# Patient Record
Sex: MALE | Race: BLACK OR AFRICAN AMERICAN | Employment: UNEMPLOYED | URBAN - METROPOLITAN AREA
[De-identification: names, ages, dates, MRNs, and addresses within clinical notes are randomized per-mention and may not be internally consistent; named-entity substitution may affect disease eponyms.]

---

## 2020-01-22 ENCOUNTER — HOSPITAL ENCOUNTER (OUTPATIENT)
Age: 50
Setting detail: SPECIMEN
Discharge: HOME | End: 2020-01-22
Payer: MEDICAID

## 2020-01-22 LAB
ALBUMIN SERPL-MCNC: 3.9 G/DL (ref 3.5–5.2)
ALBUMIN/GLOBULIN RATIO: 1.7 (ref 1–2.5)
ALP BLD-CCNC: 44 U/L (ref 40–129)
ALT SERPL-CCNC: 25 U/L (ref 5–41)
ANION GAP SERPL CALCULATED.3IONS-SCNC: 13 MMOL/L (ref 9–17)
AST SERPL-CCNC: 33 U/L
BILIRUB SERPL-MCNC: 0.69 MG/DL (ref 0.3–1.2)
BUN BLDV-MCNC: 7 MG/DL (ref 6–20)
BUN/CREAT BLD: ABNORMAL (ref 9–20)
CALCIUM SERPL-MCNC: 9.4 MG/DL (ref 8.6–10.4)
CHLORIDE BLD-SCNC: 104 MMOL/L (ref 98–107)
CHOLESTEROL/HDL RATIO: 2.9
CHOLESTEROL: 187 MG/DL
CO2: 25 MMOL/L (ref 20–31)
CREAT SERPL-MCNC: 0.83 MG/DL (ref 0.7–1.2)
GFR AFRICAN AMERICAN: >60 ML/MIN
GFR NON-AFRICAN AMERICAN: >60 ML/MIN
GFR SERPL CREATININE-BSD FRML MDRD: ABNORMAL ML/MIN/{1.73_M2}
GFR SERPL CREATININE-BSD FRML MDRD: ABNORMAL ML/MIN/{1.73_M2}
GLUCOSE BLD-MCNC: 90 MG/DL (ref 70–99)
HDLC SERPL-MCNC: 64 MG/DL
LDL CHOLESTEROL: 102 MG/DL (ref 0–130)
POTASSIUM SERPL-SCNC: 4.2 MMOL/L (ref 3.7–5.3)
PROSTATE SPECIFIC ANTIGEN: 1.19 UG/L
SODIUM BLD-SCNC: 142 MMOL/L (ref 135–144)
TOTAL PROTEIN: 6.2 G/DL (ref 6.4–8.3)
TRIGL SERPL-MCNC: 105 MG/DL
VLDLC SERPL CALC-MCNC: NORMAL MG/DL (ref 1–30)

## 2021-11-24 ENCOUNTER — HOSPITAL ENCOUNTER (INPATIENT)
Age: 51
LOS: 1 days | Discharge: HOME | DRG: 844 | End: 2021-11-25
Attending: EMERGENCY MEDICINE | Admitting: SURGERY
Payer: MEDICAID

## 2021-11-24 DIAGNOSIS — T30.0 BURN: Primary | ICD-10-CM

## 2021-11-24 PROCEDURE — 0HDGXZZ EXTRACTION OF LEFT HAND SKIN, EXTERNAL APPROACH: ICD-10-PCS | Performed by: SURGERY

## 2021-11-24 PROCEDURE — 99284 EMERGENCY DEPT VISIT MOD MDM: CPT

## 2021-11-24 ASSESSMENT — PAIN DESCRIPTION - ORIENTATION: ORIENTATION: LEFT

## 2021-11-24 ASSESSMENT — PAIN DESCRIPTION - DESCRIPTORS: DESCRIPTORS: BURNING

## 2021-11-24 ASSESSMENT — PAIN DESCRIPTION - LOCATION: LOCATION: HAND

## 2021-11-24 ASSESSMENT — PAIN SCALES - GENERAL: PAINLEVEL_OUTOF10: 2

## 2021-11-25 VITALS
OXYGEN SATURATION: 96 % | SYSTOLIC BLOOD PRESSURE: 128 MMHG | BODY MASS INDEX: 22.35 KG/M2 | HEART RATE: 62 BPM | WEIGHT: 165 LBS | RESPIRATION RATE: 18 BRPM | DIASTOLIC BLOOD PRESSURE: 63 MMHG | HEIGHT: 72 IN | TEMPERATURE: 98.1 F

## 2021-11-25 PROBLEM — T23.202A: Status: ACTIVE | Noted: 2021-11-25

## 2021-11-25 PROCEDURE — 2500000003 HC RX 250 WO HCPCS: Performed by: STUDENT IN AN ORGANIZED HEALTH CARE EDUCATION/TRAINING PROGRAM

## 2021-11-25 PROCEDURE — G0378 HOSPITAL OBSERVATION PER HR: HCPCS

## 2021-11-25 PROCEDURE — 90471 IMMUNIZATION ADMIN: CPT

## 2021-11-25 PROCEDURE — 97165 OT EVAL LOW COMPLEX 30 MIN: CPT

## 2021-11-25 PROCEDURE — 97530 THERAPEUTIC ACTIVITIES: CPT

## 2021-11-25 PROCEDURE — 96374 THER/PROPH/DIAG INJ IV PUSH: CPT

## 2021-11-25 PROCEDURE — 1200000000 HC SEMI PRIVATE

## 2021-11-25 PROCEDURE — 6360000002 HC RX W HCPCS

## 2021-11-25 PROCEDURE — 90715 TDAP VACCINE 7 YRS/> IM: CPT

## 2021-11-25 PROCEDURE — 6360000002 HC RX W HCPCS: Performed by: STUDENT IN AN ORGANIZED HEALTH CARE EDUCATION/TRAINING PROGRAM

## 2021-11-25 RX ORDER — POLYETHYLENE GLYCOL 3350 17 G/17G
17 POWDER, FOR SOLUTION ORAL DAILY PRN
Status: DISCONTINUED | OUTPATIENT
Start: 2021-11-25 | End: 2021-11-25 | Stop reason: HOSPADM

## 2021-11-25 RX ORDER — ONDANSETRON 2 MG/ML
4 INJECTION INTRAMUSCULAR; INTRAVENOUS EVERY 6 HOURS PRN
Status: DISCONTINUED | OUTPATIENT
Start: 2021-11-25 | End: 2021-11-25 | Stop reason: HOSPADM

## 2021-11-25 RX ORDER — ONDANSETRON 4 MG/1
4 TABLET, ORALLY DISINTEGRATING ORAL EVERY 8 HOURS PRN
Status: DISCONTINUED | OUTPATIENT
Start: 2021-11-25 | End: 2021-11-25 | Stop reason: HOSPADM

## 2021-11-25 RX ORDER — FENTANYL CITRATE 50 UG/ML
25 INJECTION, SOLUTION INTRAMUSCULAR; INTRAVENOUS
Status: COMPLETED | OUTPATIENT
Start: 2021-11-25 | End: 2021-11-25

## 2021-11-25 RX ADMIN — SILVER SULFADIAZINE: 10 CREAM TOPICAL at 03:14

## 2021-11-25 RX ADMIN — FENTANYL CITRATE 25 MCG: 50 INJECTION, SOLUTION INTRAMUSCULAR; INTRAVENOUS at 02:50

## 2021-11-25 RX ADMIN — TETANUS TOXOID, REDUCED DIPHTHERIA TOXOID AND ACELLULAR PERTUSSIS VACCINE, ADSORBED 0.5 ML: 5; 2.5; 8; 8; 2.5 SUSPENSION INTRAMUSCULAR at 00:23

## 2021-11-25 ASSESSMENT — PAIN SCALES - GENERAL
PAINLEVEL_OUTOF10: 7
PAINLEVEL_OUTOF10: 0

## 2021-11-25 ASSESSMENT — PAIN DESCRIPTION - LOCATION: LOCATION: HAND

## 2021-11-25 ASSESSMENT — ENCOUNTER SYMPTOMS
SHORTNESS OF BREATH: 0
SINUS PRESSURE: 0
VOMITING: 0
ABDOMINAL PAIN: 0
SORE THROAT: 0
NAUSEA: 0
RHINORRHEA: 0
DIARRHEA: 0
EYE PAIN: 0
CHEST TIGHTNESS: 0
COLOR CHANGE: 0
COUGH: 0
PHOTOPHOBIA: 0
EYE REDNESS: 0
CONSTIPATION: 0

## 2021-11-25 ASSESSMENT — PAIN DESCRIPTION - PAIN TYPE: TYPE: ACUTE PAIN

## 2021-11-25 ASSESSMENT — PAIN DESCRIPTION - DESCRIPTORS: DESCRIPTORS: BURNING

## 2021-11-25 ASSESSMENT — PAIN DESCRIPTION - ORIENTATION: ORIENTATION: LEFT
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
## 2021-11-25 NOTE — PLAN OF CARE
Problem: Pain:  Goal: Pain level will decrease  Description: Pain level will decrease  11/25/2021 1241 by Vasiliy Artis RN  Outcome: Ongoing  11/25/2021 0436 by Lin Huffman RN  Outcome: Ongoing  Goal: Control of acute pain  Description: Control of acute pain  11/25/2021 1241 by Vasiliy Artis RN  Outcome: Ongoing  11/25/2021 0436 by Lin Huffman RN  Outcome: Ongoing  Goal: Control of chronic pain  Description: Control of chronic pain  Outcome: Ongoing     Problem:  Activity:  Goal: Ability to perform activities at highest level will improve  Description: Ability to perform activities at highest level will improve  11/25/2021 1241 by Vasiliy Artis RN  Outcome: Ongoing  11/25/2021 0436 by Lin Huffman RN  Outcome: Ongoing  Goal: Mobility will improve  Description: Mobility will improve  11/25/2021 1241 by Vasiliy Artis RN  Outcome: Ongoing  11/25/2021 0436 by Lin Huffman RN  Outcome: Met This Shift     Problem: Coping:  Goal: Coping behaviors will improve  Description: Coping behaviors will improve  11/25/2021 1241 by Vasiliy Artis RN  Outcome: Ongoing  11/25/2021 0436 by Lin Huffman RN  Outcome: Met This Shift  Goal: Verbalizations of decreased anxiety will increase  Description: Verbalizations of decreased anxiety will increase  11/25/2021 1241 by Vasiliy Artis RN  Outcome: Ongoing  11/25/2021 0436 by Lin Huffman RN  Outcome: Met This Shift  Goal: Verbalization of a cessation or decrease of fear will increase  Description: Verbalization of a cessation or decrease of fear will increase  11/25/2021 1241 by Vasiliy Artis RN  Outcome: Ongoing  11/25/2021 0436 by Lin Huffman RN  Outcome: Met This Shift  Goal: Ability to verbalize positive feelings about self will improve  Description: Ability to verbalize positive feelings about self will improve  11/25/2021 1241 by Vasiliy Artis RN  Outcome: Ongoing  11/25/2021 0436 by Lin Huffman RN  Outcome: Met This Shift     Problem: Fluid Volume:  Goal: Will maintain adequate fluid volume  Description: Will maintain adequate fluid volume  11/25/2021 1241 by Yao Bernal RN  Outcome: Ongoing  11/25/2021 0436 by Carlota Ramos RN  Outcome: Met This Shift     Problem: Health Behavior:  Goal: Ability to manage health-related needs will improve  Description: Ability to manage health-related needs will improve  11/25/2021 1241 by Yao Bernal RN  Outcome: Ongoing  11/25/2021 0436 by Carlota Ramos RN  Outcome: Met This Shift     Problem: Nutritional:  Goal: Consumption of the prescribed amount of daily calories will improve  Description: Consumption of the prescribed amount of daily calories will improve  Outcome: Ongoing     Problem: Physical Regulation:  Goal: Ability to maintain a body temperature in the normal range will improve  Description: Ability to maintain a body temperature in the normal range will improve  11/25/2021 1241 by Yao Bernal RN  Outcome: Ongoing  11/25/2021 0436 by Carlota Ramos RN  Outcome: Met This Shift  Goal: Complications related to the disease process, condition or treatment will be avoided or minimized  Description: Complications related to the disease process, condition or treatment will be avoided or minimized  11/25/2021 1241 by Yao Bernal RN  Outcome: Ongoing  11/25/2021 0436 by Carlota Ramos RN  Outcome: Ongoing     Problem: Respiratory:  Goal: Ability to maintain a clear airway will improve  Description: Ability to maintain a clear airway will improve  11/25/2021 1241 by Yao Bernal RN  Outcome: Ongoing  11/25/2021 0436 by Carlota Ramos RN  Outcome: Met This Shift  Goal: Ability to maintain adequate ventilation will improve  Description: Ability to maintain adequate ventilation will improve  Outcome: Ongoing     Problem: Sensory:  Goal: Pain level will decrease  Description: Pain level will decrease  11/25/2021 1241 by Yao Bernal RN  Outcome: Ongoing  11/25/2021 0436 by Carlota Ramos RN  Outcome: Ongoing  Goal: General
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
## 2021-11-25 NOTE — ED NOTES
Bed: 06  Expected date: 11/24/21  Expected time: 11:50 PM  Means of arrival:   Comments:  45 Lucinda Benjamin RN  11/24/21 6196
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
## 2021-11-25 NOTE — PLAN OF CARE
Problem: Activity:  Goal: Mobility will improve  Description: Mobility will improve  Outcome: Met This Shift     Problem: Coping:  Goal: Coping behaviors will improve  Description: Coping behaviors will improve  Outcome: Met This Shift  Goal: Verbalizations of decreased anxiety will increase  Description: Verbalizations of decreased anxiety will increase  Outcome: Met This Shift  Goal: Verbalization of a cessation or decrease of fear will increase  Description: Verbalization of a cessation or decrease of fear will increase  Outcome: Met This Shift  Goal: Ability to verbalize positive feelings about self will improve  Description: Ability to verbalize positive feelings about self will improve  Outcome: Met This Shift     Problem: Fluid Volume:  Goal: Will maintain adequate fluid volume  Description: Will maintain adequate fluid volume  Outcome: Met This Shift     Problem: Health Behavior:  Goal: Ability to manage health-related needs will improve  Description: Ability to manage health-related needs will improve  Outcome: Met This Shift     Problem: Physical Regulation:  Goal: Ability to maintain a body temperature in the normal range will improve  Description: Ability to maintain a body temperature in the normal range will improve  Outcome: Met This Shift     Problem: Respiratory:  Goal: Ability to maintain a clear airway will improve  Description: Ability to maintain a clear airway will improve  Outcome: Met This Shift     Problem: Pain:  Goal: Pain level will decrease  Description: Pain level will decrease  Outcome: Ongoing  Goal: Control of acute pain  Description: Control of acute pain  Outcome: Ongoing     Problem:  Activity:  Goal: Ability to perform activities at highest level will improve  Description: Ability to perform activities at highest level will improve  Outcome: Ongoing     Problem: Physical Regulation:  Goal: Complications related to the disease process, condition or treatment will be avoided or minimized  Description: Complications related to the disease process, condition or treatment will be avoided or minimized  Outcome: Ongoing     Problem: Sensory:  Goal: Pain level will decrease  Description: Pain level will decrease  Outcome: Ongoing     Problem: Skin Integrity:  Goal: Signs of wound healing will improve  Description: Signs of wound healing will improve  Outcome: Ongoing
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
## 2021-11-25 NOTE — PROGRESS NOTES
Physical Therapy        Physical Therapy Cancel Note      DATE: 2021    NAME: Toya Santana  MRN: [id number]   : 1970      Patient not seen this date for Physical Therapy due to:    Patient independent with functional mobility. Will defer PT evaluation at this time. Please reorder PT if future needs arise.        Electronically signed by John Hurt PT on 2021 at 10:29 AM
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
## 2022-12-09 ENCOUNTER — HOSPITAL ENCOUNTER (EMERGENCY)
Age: 52
Discharge: HOME | End: 2022-12-09
Attending: EMERGENCY MEDICINE
Payer: MEDICAID

## 2022-12-09 VITALS
BODY MASS INDEX: 20.99 KG/M2 | RESPIRATION RATE: 18 BRPM | TEMPERATURE: 96.8 F | HEART RATE: 60 BPM | WEIGHT: 155 LBS | DIASTOLIC BLOOD PRESSURE: 94 MMHG | SYSTOLIC BLOOD PRESSURE: 148 MMHG | HEIGHT: 72 IN | OXYGEN SATURATION: 96 %

## 2022-12-09 DIAGNOSIS — S61.422A LACERATION OF LEFT HAND WITH FOREIGN BODY, INITIAL ENCOUNTER: Primary | ICD-10-CM

## 2022-12-09 PROCEDURE — 12001 RPR S/N/AX/GEN/TRNK 2.5CM/<: CPT

## 2022-12-09 PROCEDURE — 2500000003 HC RX 250 WO HCPCS

## 2022-12-09 PROCEDURE — 99283 EMERGENCY DEPT VISIT LOW MDM: CPT

## 2022-12-09 RX ORDER — LIDOCAINE HYDROCHLORIDE 10 MG/ML
5 INJECTION, SOLUTION INFILTRATION; PERINEURAL ONCE
Status: COMPLETED | OUTPATIENT
Start: 2022-12-09 | End: 2022-12-09

## 2022-12-09 RX ADMIN — LIDOCAINE HYDROCHLORIDE 5 ML: 10 INJECTION, SOLUTION INFILTRATION; PERINEURAL at 01:09

## 2022-12-09 ASSESSMENT — ENCOUNTER SYMPTOMS
CHEST TIGHTNESS: 0
ABDOMINAL PAIN: 0
SHORTNESS OF BREATH: 0
VOMITING: 0
NAUSEA: 0
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
## 2022-12-09 NOTE — ED NOTES
Sw consulted for transportation assistance to address on file. [facility] insurance utilized. Trip # [id number]. ETA 3 hours.       TIM Salgado  12/09/22 0229

## 2022-12-09 NOTE — ED NOTES
FLORENCIO called pt's insurance a second time to see when transportation is coming per request of triage. Per [facility]  a new trip was booked for pt. New trip # [id number]. ETA [id number] via [address] per . Pt informed.       Mariah Anne, TIM  12/09/22 7252
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
## 2022-12-09 NOTE — ED PROVIDER NOTES
[facility] ED  Emergency Department Encounter  Emergency Medicine Resident     Pt Alireza Kaur  MRN: [id number]  [place] 1970  Date of evaluation: 12/9/22  PCP:  No primary care provider on file. CHIEF COMPLAINT       Chief Complaint   Patient presents with    Laceration     To left hand. Cut on knife trying to break ice. HISTORY OF PRESENT ILLNESS  (Location/Symptom, Timing/Onset, Context/Setting, Quality, Duration, Modifying Factors, Severity.)      Abbey Sloan is a 45 y.o. right-handed male who presents with left hand injury. Patient states that he had frozen juice in his left hand and was stabbing it with a clean, nonrusty steak knife when his  slipped and he stabbed his left hand. Patient denies injury elsewhere, denies anticoagulation, denies bleeding disorder, denies any other ongoing medical conditions or ongoing medications. Patient states that the knife did not break. PAST MEDICAL / SURGICAL / SOCIAL / FAMILY HISTORY      has no past medical history on file. has no past surgical history on file. Social History     Socioeconomic History    Marital status: Single     Spouse name: Not on file    Number of children: Not on file    Years of education: Not on file    Highest education level: Not on file   Occupational History    Not on file   Tobacco Use    Smoking status: Never    Smokeless tobacco: Never   Substance and Sexual Activity    Alcohol use: Yes     Comment: daily    Drug use: Never    Sexual activity: Not on file   Other Topics Concern    Not on file   Social History Narrative    Not on file     Social Determinants of Health     Financial Resource Strain: Not on file   Food Insecurity: Not on file   Transportation Needs: Not on file   Physical Activity: Not on file   Stress: Not on file   Social Connections: Not on file   Intimate Partner Violence: Not on file   Housing Stability: Not on file       History reviewed.  No pertinent family history. Allergies:  Bactrim [sulfamethoxazole-trimethoprim]    Home Medications:  Prior to Admission medications    Medication Sig Start Date End Date Taking? Authorizing Provider   silver sulfADIAZINE (SILVADENE) 1 % cream Apply topically daily. 11/25/21   Audi Gleason MD       REVIEW OF SYSTEMS    (2-9 systems for level 4, 10 or more for level 5)      Review of Systems   Constitutional:  Negative for chills and fever. Eyes:  Negative for visual disturbance. Respiratory:  Negative for chest tightness and shortness of breath. Cardiovascular:  Negative for chest pain. Gastrointestinal:  Negative for abdominal pain, nausea and vomiting. Musculoskeletal:  Negative for arthralgias. Skin:  Positive for wound. Negative for rash. Neurological:  Negative for syncope, weakness and numbness. PHYSICAL EXAM   (up to 7 for level 4, 8 or more for level 5)      INITIAL VITALS:   BP (!) 148/94   Pulse 60   Temp 96.8 °F (36 °C)   Resp 18   Ht 6' (1.829 m)   Wt 155 lb (70.3 kg)   SpO2 96%   BMI 21.02 kg/m²     Physical Exam  Vitals reviewed. Constitutional:       Appearance: He is not toxic-appearing. HENT:      Head: Normocephalic. Nose: Nose normal.   Eyes:      Extraocular Movements: Extraocular movements intact. Cardiovascular:      Rate and Rhythm: Normal rate and regular rhythm. Pulses: Normal pulses. Pulmonary:      Effort: Pulmonary effort is normal. No respiratory distress. Musculoskeletal:         General: No swelling or deformity. Normal range of motion. Left wrist: Normal pulse. Right hand: Normal.      Left hand: Laceration present. No swelling, deformity or bony tenderness. Normal range of motion. Normal strength. Normal sensation. Normal capillary refill. Skin:     General: Skin is warm. Capillary Refill: Capillary refill takes less than 2 seconds. Neurological:      Mental Status: He is alert and oriented to person, place, and time. Sensory: No sensory deficit. Motor: No weakness. DIFFERENTIAL  DIAGNOSIS     PLAN (LABS / IMAGING / EKG):  Orders Placed This Encounter   Procedures    LACERATION REPAIR       MEDICATIONS ORDERED:  Orders Placed This Encounter   Medications    lidocaine 1 % injection 5 mL       DDX: Superficial laceration, tendon involvement, arterial involvement, nerve involvement, retained foreign body were considered. DIAGNOSTIC RESULTS / EMERGENCY DEPARTMENT COURSE / MDM   LAB RESULTS:  No results found for this visit on 12/09/22. IMPRESSION: 18-year-old right-hand-dominant male who is otherwise well presenting with accidental laceration to the left hand. Patient is a  works frequently with his hands in water. RADIOLOGY:  No orders to display         EKG    All EKG's are interpreted by the Emergency Department Physician who either signs or Co-signs this chart in the absence of a cardiologist.    EMERGENCY DEPARTMENT COURSE:  Following history and examination, the laceration was irrigated with 600 mL water and sutured as detailed below. Given that the patient stated that the knife was clean, unbroken and the patient received tetanus booster last year then no x-ray or tetanus booster required at this time. Patient was discharged with care instructions and return precautions, including to return/seek medical attention in 5 to 7 days for wound reevaluation/suture removal.       No notes of EC Admission Criteria type on file.     PROCEDURES:  Lac Repair    Date/Time: 12/9/2022 2:10 AM  Performed by: Rafa Massey MD  Authorized by: Terry Retana MD     Consent:     Consent obtained:  Verbal    Consent given by:  Patient    Risks, benefits, and alternatives were discussed: yes      Risks discussed:  Infection, pain and poor cosmetic result    Alternatives discussed:  No treatment  Universal protocol:     Procedure explained and questions answered to patient or proxy's satisfaction: yes      Required blood products, implants, devices, and special equipment available: yes      Site/side marked: yes      Patient identity confirmed:  Verbally with patient  Anesthesia:     Anesthesia method:  Local infiltration    Local anesthetic:  Lidocaine 1% w/o epi  Laceration details:     Location:  Hand    Hand location:  R hand, dorsum    Length (cm):  1.5    Depth (mm):  1.5  Exploration:     Limited defect created (wound extended): no      Hemostasis achieved with:  Direct pressure    Imaging outcome: foreign body not noted      Wound exploration: entire depth of wound visualized      Wound extent: fascia violated      Contaminated: no    Treatment:     Area cleansed with:  Saline    Amount of cleaning:  Standard    Irrigation solution:  Sterile saline    Irrigation volume:  600mL    Irrigation method:  Pressure wash    Debridement:  None    Undermining:  None    Scar revision: no    Skin repair:     Repair method:  Sutures    Suture size:  5-0    Suture material:  Nylon    Number of sutures:  3  Approximation:     Approximation:  Close  Repair type:     Repair type:  Simple  Post-procedure details:     Dressing:  Open (no dressing)    Procedure completion:  Tolerated well, no immediate complications     CONSULTS:  None    CRITICAL CARE:    FINAL IMPRESSION      1.  Laceration of left hand with foreign body, initial encounter          DISPOSITION / PLAN     DISPOSITION Decision To Discharge 12/09/2022 01:36:46 AM      PATIENT REFERRED TO:  [facility]  [address] [place] [address]  [phone]  Go to   If symptoms worsen    DISCHARGE MEDICATIONS:  Discharge Medication List as of 12/9/2022  1:57 AM          Elisabet Lamb MD  Emergency Medicine Resident    (Please note that portions of thisnote were completed with a voice recognition program.  Efforts were made to edit the dictations but occasionally words are mis-transcribed.)      Elisabet Lamb MD  Resident  12/09/22 0800

## 2023-05-31 ENCOUNTER — HOSPITAL ENCOUNTER (OUTPATIENT)
Dept: CT IMAGING | Age: 53
Discharge: HOME | End: 2023-06-02
Payer: MEDICAID

## 2023-05-31 ENCOUNTER — HOSPITAL ENCOUNTER (OUTPATIENT)
Age: 53
Discharge: HOME | End: 2023-05-31
Payer: MEDICAID

## 2023-05-31 DIAGNOSIS — D17.1 LIPOMA OF BACK: ICD-10-CM

## 2023-05-31 LAB
25(OH)D3 SERPL-MCNC: 36.4 NG/ML
ALBUMIN SERPL-MCNC: 4.1 G/DL (ref 3.5–5.2)
ALBUMIN/GLOB SERPL: 1.3 {RATIO} (ref 1–2.5)
ALP SERPL-CCNC: 65 U/L (ref 40–129)
ALT SERPL-CCNC: 23 U/L (ref 5–41)
AST SERPL-CCNC: 37 U/L
BASOPHILS # BLD: 0 K/UL (ref 0–0.2)
BASOPHILS NFR BLD: 0 % (ref 0–2)
BILIRUB DIRECT SERPL-MCNC: <0.1 MG/DL
BILIRUB INDIRECT SERPL-MCNC: NORMAL MG/DL (ref 0–1)
BILIRUB SERPL-MCNC: 0.3 MG/DL (ref 0.3–1.2)
CHOLEST SERPL-MCNC: 193 MG/DL
CHOLESTEROL/HDL RATIO: 2.4
EOSINOPHIL # BLD: 0.06 K/UL (ref 0–0.4)
EOSINOPHILS RELATIVE PERCENT: 2 % (ref 1–4)
ERYTHROCYTE [DISTWIDTH] IN BLOOD BY AUTOMATED COUNT: 12.5 % (ref 11.8–14.4)
EST. AVERAGE GLUCOSE BLD GHB EST-MCNC: 114 MG/DL
HBA1C MFR BLD: 5.6 % (ref 4–6)
HCT VFR BLD AUTO: 36.9 % (ref 40.7–50.3)
HDLC SERPL-MCNC: 80 MG/DL
HGB BLD-MCNC: 12.5 G/DL (ref 13–17)
HIV 1+2 AB+HIV1 P24 AG SERPL QL IA: NONREACTIVE
IMM GRANULOCYTES # BLD AUTO: 0 K/UL (ref 0–0.3)
IMM GRANULOCYTES NFR BLD: 0 %
LDLC SERPL CALC-MCNC: 98 MG/DL (ref 0–130)
LYMPHOCYTES # BLD: 54 % (ref 24–44)
LYMPHOCYTES NFR BLD: 1.73 K/UL (ref 1–4.8)
MCH RBC QN AUTO: 32.1 PG (ref 25.2–33.5)
MCHC RBC AUTO-ENTMCNC: 33.9 G/DL (ref 28.4–34.8)
MCV RBC AUTO: 94.9 FL (ref 82.6–102.9)
MONOCYTES NFR BLD: 0.35 K/UL (ref 0.1–0.8)
MONOCYTES NFR BLD: 11 % (ref 1–7)
MORPHOLOGY: NORMAL
NEUTROPHILS NFR BLD: 33 % (ref 36–66)
NEUTS SEG NFR BLD: 1.06 K/UL (ref 1.8–7.7)
NRBC AUTOMATED: 0 PER 100 WBC
PLATELET # BLD AUTO: 251 K/UL (ref 138–453)
PMV BLD AUTO: 8.4 FL (ref 8.1–13.5)
PROT SERPL-MCNC: 7.2 G/DL (ref 6.4–8.3)
PSA SERPL-MCNC: 1.79 NG/ML
RBC # BLD AUTO: 3.89 M/UL (ref 4.21–5.77)
T PALLIDUM AB SER QL IA: NONREACTIVE
TRIGL SERPL-MCNC: 75 MG/DL
TSH SERPL-MCNC: 0.56 UIU/ML (ref 0.3–5)
WBC OTHER # BLD: 3.2 K/UL (ref 3.5–11.3)

## 2023-05-31 PROCEDURE — 73201 CT UPPER EXTREMITY W/DYE: CPT

## 2023-05-31 PROCEDURE — 86780 TREPONEMA PALLIDUM: CPT

## 2023-05-31 PROCEDURE — 80076 HEPATIC FUNCTION PANEL: CPT

## 2023-05-31 PROCEDURE — 87661 TRICHOMONAS VAGINALIS AMPLIF: CPT

## 2023-05-31 PROCEDURE — 87591 N.GONORRHOEAE DNA AMP PROB: CPT

## 2023-05-31 PROCEDURE — 87491 CHLMYD TRACH DNA AMP PROBE: CPT

## 2023-05-31 PROCEDURE — 80061 LIPID PANEL: CPT

## 2023-05-31 PROCEDURE — 84153 ASSAY OF PSA TOTAL: CPT

## 2023-05-31 PROCEDURE — 82306 VITAMIN D 25 HYDROXY: CPT

## 2023-05-31 PROCEDURE — 85025 COMPLETE CBC W/AUTO DIFF WBC: CPT

## 2023-05-31 PROCEDURE — 6360000004 HC RX CONTRAST MEDICATION: Performed by: REGISTERED NURSE

## 2023-05-31 PROCEDURE — 83036 HEMOGLOBIN GLYCOSYLATED A1C: CPT

## 2023-05-31 PROCEDURE — 87389 HIV-1 AG W/HIV-1&-2 AB AG IA: CPT

## 2023-05-31 PROCEDURE — 84443 ASSAY THYROID STIM HORMONE: CPT

## 2023-05-31 RX ADMIN — IOPAMIDOL 75 ML: 755 INJECTION, SOLUTION INTRAVENOUS at 12:11

## 2023-06-01 LAB
CHLAMYDIA DNA UR QL NAA+PROBE: NEGATIVE
N GONORRHOEA DNA UR QL NAA+PROBE: NEGATIVE
SOURCE: NORMAL
SPECIMEN DESCRIPTION: NORMAL
TRICHOMONAS VAGINALI, MOLECULAR: NEGATIVE

## 2023-10-06 ENCOUNTER — TELEPHONE (OUTPATIENT)
Dept: GASTROENTEROLOGY | Age: 53
End: 2023-10-06

## 2023-10-06 NOTE — TELEPHONE ENCOUNTER
NP/Unspecified hemorrhoids/[employer]    Pt called and [facility] to [facility]. Writer called pt and LVM to call the office to [facility] hospital an appt.

## 2023-10-26 ENCOUNTER — OFFICE VISIT (OUTPATIENT)
Dept: GASTROENTEROLOGY | Age: 53
End: 2023-10-26
Payer: MEDICAID

## 2023-10-26 VITALS
BODY MASS INDEX: 20.51 KG/M2 | WEIGHT: 151.4 LBS | DIASTOLIC BLOOD PRESSURE: 89 MMHG | HEIGHT: 72 IN | SYSTOLIC BLOOD PRESSURE: 139 MMHG | HEART RATE: 58 BPM

## 2023-10-26 DIAGNOSIS — K62.5 HEMORRHAGE OF RECTUM AND ANUS: Primary | ICD-10-CM

## 2023-10-26 DIAGNOSIS — K59.00 CONSTIPATION, UNSPECIFIED CONSTIPATION TYPE: ICD-10-CM

## 2023-10-26 PROCEDURE — 1036F TOBACCO NON-USER: CPT | Performed by: INTERNAL MEDICINE

## 2023-10-26 PROCEDURE — G8420 CALC BMI NORM PARAMETERS: HCPCS | Performed by: INTERNAL MEDICINE

## 2023-10-26 PROCEDURE — G8427 DOCREV CUR MEDS BY ELIG CLIN: HCPCS | Performed by: INTERNAL MEDICINE

## 2023-10-26 PROCEDURE — 3017F COLORECTAL CA SCREEN DOC REV: CPT | Performed by: INTERNAL MEDICINE

## 2023-10-26 PROCEDURE — G8484 FLU IMMUNIZE NO ADMIN: HCPCS | Performed by: INTERNAL MEDICINE

## 2023-10-26 PROCEDURE — 99204 OFFICE O/P NEW MOD 45 MIN: CPT | Performed by: INTERNAL MEDICINE

## 2023-10-26 RX ORDER — LORATADINE 10 MG/1
TABLET ORAL
COMMUNITY
Start: 2023-09-13

## 2023-10-26 RX ORDER — POLYETHYLENE GLYCOL 3350 17 G/17G
POWDER, FOR SOLUTION ORAL
Qty: 238 G | Refills: 0 | Status: SHIPPED | OUTPATIENT
Start: 2023-10-26

## 2023-10-26 RX ORDER — BISACODYL 5 MG/1
TABLET, DELAYED RELEASE ORAL
Qty: 4 TABLET | Refills: 0 | Status: SHIPPED | OUTPATIENT
Start: 2023-10-26

## 2023-10-26 ASSESSMENT — ENCOUNTER SYMPTOMS
BACK PAIN: 0
CHOKING: 0
DIARRHEA: 0
CONSTIPATION: 0
COUGH: 0
BLOOD IN STOOL: 0
VOMITING: 0
WHEEZING: 0
SINUS PRESSURE: 0
ANAL BLEEDING: 0
ABDOMINAL PAIN: 0
TROUBLE SWALLOWING: 0
ABDOMINAL DISTENTION: 0
SORE THROAT: 0
NAUSEA: 0
VOICE CHANGE: 0
RECTAL PAIN: 0

## 2023-10-26 NOTE — PROGRESS NOTES
RDW 12.5 05/31/2023    MPV 8.4 05/31/2023    BASOPCT 0 05/31/2023    LYMPHSABS 1.73 05/31/2023    MONOSABS 0.35 05/31/2023    NEUTROABS 1.06 (L) 05/31/2023    EOSABS 0.06 05/31/2023    BASOSABS 0.00 05/31/2023         DIAGNOSTIC TESTING:     No results found. IMPRESSION: Mr. Kylie Rosado is a 52 y.o. male with     Assessment:  1. Hemorrhage of rectum and anus    2. Constipation, unspecified constipation type        Plan:  Patient has history of intermittent hematochezia for couple of years and felt that he had antibiotics causing the symptoms. He never had investigations in the past.  No family history of colon cancer malignancy etc.    Patient is not on anticoagulation therapy. He needs colonoscopy evaluate hematochezia,/screening. Discussed with the patient regarding the procedure risks and benefits. Patient understood and verbalized consent. Spent 30 minutes providing patient education and counseling. Thank you for allowing me to participate in the care of Mr. Kylie Rosado. For any further questions please do not hesitate to contact me. Note is dictated utilizing voice recognition software. Unfortunately this leads to occasional typographical errors. Please contact our office if you have any questions. I have reviewed and agree with the MA/LPN ROS.      John Resendez MD, [facility]  Board Certified in Gastroenterology and [address] [facility]  Office #: [phone]

## 2023-11-16 ENCOUNTER — HOSPITAL ENCOUNTER (OUTPATIENT)
Age: 53
Setting detail: OUTPATIENT SURGERY
Discharge: HOME | End: 2023-11-16
Attending: INTERNAL MEDICINE | Admitting: INTERNAL MEDICINE
Payer: MEDICAID

## 2023-11-16 ENCOUNTER — ANESTHESIA EVENT (OUTPATIENT)
Dept: OPERATING ROOM | Age: 53
End: 2023-11-16
Payer: MEDICAID

## 2023-11-16 ENCOUNTER — ANESTHESIA (OUTPATIENT)
Dept: OPERATING ROOM | Age: 53
End: 2023-11-16
Payer: MEDICAID

## 2023-11-16 VITALS
BODY MASS INDEX: 20.32 KG/M2 | WEIGHT: 150 LBS | SYSTOLIC BLOOD PRESSURE: 133 MMHG | OXYGEN SATURATION: 97 % | HEART RATE: 63 BPM | DIASTOLIC BLOOD PRESSURE: 93 MMHG | RESPIRATION RATE: 14 BRPM | TEMPERATURE: 97.3 F | HEIGHT: 72 IN

## 2023-11-16 DIAGNOSIS — K62.5 HEMORRHAGE OF RECTUM: ICD-10-CM

## 2023-11-16 PROCEDURE — 3700000000 HC ANESTHESIA ATTENDED CARE: Performed by: INTERNAL MEDICINE

## 2023-11-16 PROCEDURE — 88305 TISSUE EXAM BY PATHOLOGIST: CPT

## 2023-11-16 PROCEDURE — 7100000011 HC PHASE II RECOVERY - ADDTL 15 MIN: Performed by: INTERNAL MEDICINE

## 2023-11-16 PROCEDURE — 45385 COLONOSCOPY W/LESION REMOVAL: CPT | Performed by: INTERNAL MEDICINE

## 2023-11-16 PROCEDURE — 6360000002 HC RX W HCPCS: Performed by: NURSE ANESTHETIST, CERTIFIED REGISTERED

## 2023-11-16 PROCEDURE — C1889 IMPLANT/INSERT DEVICE, NOC: HCPCS | Performed by: INTERNAL MEDICINE

## 2023-11-16 PROCEDURE — 2709999900 HC NON-CHARGEABLE SUPPLY: Performed by: INTERNAL MEDICINE

## 2023-11-16 PROCEDURE — 2580000003 HC RX 258: Performed by: ANESTHESIOLOGY

## 2023-11-16 PROCEDURE — 88341 IMHCHEM/IMCYTCHM EA ADD ANTB: CPT

## 2023-11-16 PROCEDURE — 3700000001 HC ADD 15 MINUTES (ANESTHESIA): Performed by: INTERNAL MEDICINE

## 2023-11-16 PROCEDURE — 45380 COLONOSCOPY AND BIOPSY: CPT | Performed by: INTERNAL MEDICINE

## 2023-11-16 PROCEDURE — 7100000010 HC PHASE II RECOVERY - FIRST 15 MIN: Performed by: INTERNAL MEDICINE

## 2023-11-16 PROCEDURE — 3609010700 HC COLONOSCOPY POLYPECTOMY REMOVAL SNARE/STOMA: Performed by: INTERNAL MEDICINE

## 2023-11-16 PROCEDURE — 88342 IMHCHEM/IMCYTCHM 1ST ANTB: CPT

## 2023-11-16 PROCEDURE — 2500000003 HC RX 250 WO HCPCS: Performed by: NURSE ANESTHETIST, CERTIFIED REGISTERED

## 2023-11-16 DEVICE — WORKING LENGTH 235CM, WORKING CHANNEL 2.8MM
Type: IMPLANTABLE DEVICE | Status: FUNCTIONAL
Brand: RESOLUTION 360 CLIP

## 2023-11-16 DEVICE — CLIP
Type: IMPLANTABLE DEVICE | Status: FUNCTIONAL
Brand: RESOLUTION 360™ ULTRA CLIP

## 2023-11-16 RX ORDER — SODIUM CHLORIDE 9 MG/ML
INJECTION, SOLUTION INTRAVENOUS CONTINUOUS
Status: DISCONTINUED | OUTPATIENT
Start: 2023-11-16 | End: 2023-11-16 | Stop reason: HOSPADM

## 2023-11-16 RX ORDER — LIDOCAINE HYDROCHLORIDE 10 MG/ML
INJECTION, SOLUTION EPIDURAL; INFILTRATION; INTRACAUDAL; PERINEURAL PRN
Status: DISCONTINUED | OUTPATIENT
Start: 2023-11-16 | End: 2023-11-16 | Stop reason: SDUPTHER

## 2023-11-16 RX ORDER — SODIUM CHLORIDE 0.9 % (FLUSH) 0.9 %
5-40 SYRINGE (ML) INJECTION PRN
Status: DISCONTINUED | OUTPATIENT
Start: 2023-11-16 | End: 2023-11-16 | Stop reason: HOSPADM

## 2023-11-16 RX ORDER — SODIUM CHLORIDE 0.9 % (FLUSH) 0.9 %
5-40 SYRINGE (ML) INJECTION EVERY 12 HOURS SCHEDULED
Status: DISCONTINUED | OUTPATIENT
Start: 2023-11-16 | End: 2023-11-16 | Stop reason: HOSPADM

## 2023-11-16 RX ORDER — SODIUM CHLORIDE, SODIUM LACTATE, POTASSIUM CHLORIDE, CALCIUM CHLORIDE 600; 310; 30; 20 MG/100ML; MG/100ML; MG/100ML; MG/100ML
INJECTION, SOLUTION INTRAVENOUS CONTINUOUS
Status: DISCONTINUED | OUTPATIENT
Start: 2023-11-16 | End: 2023-11-16 | Stop reason: HOSPADM

## 2023-11-16 RX ORDER — LIDOCAINE HYDROCHLORIDE 10 MG/ML
1 INJECTION, SOLUTION EPIDURAL; INFILTRATION; INTRACAUDAL; PERINEURAL
Status: DISCONTINUED | OUTPATIENT
Start: 2023-11-17 | End: 2023-11-16 | Stop reason: HOSPADM

## 2023-11-16 RX ORDER — PROPOFOL 10 MG/ML
INJECTION, EMULSION INTRAVENOUS PRN
Status: DISCONTINUED | OUTPATIENT
Start: 2023-11-16 | End: 2023-11-16 | Stop reason: SDUPTHER

## 2023-11-16 RX ORDER — SODIUM CHLORIDE 9 MG/ML
INJECTION, SOLUTION INTRAVENOUS PRN
Status: DISCONTINUED | OUTPATIENT
Start: 2023-11-16 | End: 2023-11-16 | Stop reason: HOSPADM

## 2023-11-16 RX ADMIN — PROPOFOL 50 MG: 10 INJECTION, EMULSION INTRAVENOUS at 10:21

## 2023-11-16 RX ADMIN — PROPOFOL 50 MG: 10 INJECTION, EMULSION INTRAVENOUS at 10:31

## 2023-11-16 RX ADMIN — PROPOFOL 50 MG: 10 INJECTION, EMULSION INTRAVENOUS at 10:44

## 2023-11-16 RX ADMIN — PROPOFOL 50 MG: 10 INJECTION, EMULSION INTRAVENOUS at 10:26

## 2023-11-16 RX ADMIN — SODIUM CHLORIDE, POTASSIUM CHLORIDE, SODIUM LACTATE AND CALCIUM CHLORIDE: 600; 310; 30; 20 INJECTION, SOLUTION INTRAVENOUS at 09:36

## 2023-11-16 RX ADMIN — PROPOFOL 50 MG: 10 INJECTION, EMULSION INTRAVENOUS at 10:29

## 2023-11-16 RX ADMIN — LIDOCAINE HYDROCHLORIDE 50 MG: 10 INJECTION, SOLUTION EPIDURAL; INFILTRATION; INTRACAUDAL; PERINEURAL at 10:13

## 2023-11-16 RX ADMIN — PROPOFOL 25 MG: 10 INJECTION, EMULSION INTRAVENOUS at 10:50

## 2023-11-16 RX ADMIN — PROPOFOL 50 MG: 10 INJECTION, EMULSION INTRAVENOUS at 10:38

## 2023-11-16 RX ADMIN — PROPOFOL 50 MG: 10 INJECTION, EMULSION INTRAVENOUS at 10:13

## 2023-11-16 RX ADMIN — PROPOFOL 50 MG: 10 INJECTION, EMULSION INTRAVENOUS at 10:33

## 2023-11-16 RX ADMIN — PROPOFOL 25 MG: 10 INJECTION, EMULSION INTRAVENOUS at 10:55

## 2023-11-16 RX ADMIN — PROPOFOL 50 MG: 10 INJECTION, EMULSION INTRAVENOUS at 10:28

## 2023-11-16 ASSESSMENT — PAIN - FUNCTIONAL ASSESSMENT: PAIN_FUNCTIONAL_ASSESSMENT: 0-10

## 2023-11-16 ASSESSMENT — LIFESTYLE VARIABLES: SMOKING_STATUS: 1

## 2023-11-16 NOTE — ANESTHESIA PRE PROCEDURE
POC Tests: No results for input(s): \"POCGLU\", \"POCNA\", \"POCK\", \"POCCL\", \"POCBUN\", \"POCHEMO\", \"POCHCT\" in the last 72 hours. Coags: No results found for: \"PROTIME\", \"INR\", \"APTT\"    HCG (If Applicable): No results found for: \"PREGTESTUR\", \"PREGSERUM\", \"HCG\", \"HCGQUANT\"     ABGs: No results found for: \"PHART\", \"PO2ART\", \"ZUJ8VMP\", \"EHB9KKF\", \"BEART\", \"Y3NZUKOU\"     Type & Screen (If Applicable):  No results found for: \"LABABO\", \"LABRH\"    Drug/Infectious Status (If Applicable):  No results found for: \"HIV\", \"HEPCAB\"    COVID-19 Screening (If Applicable): No results found for: \"COVID19\"        Anesthesia Evaluation  Patient summary reviewed and Nursing notes reviewed no history of anesthetic complications:   Airway: Mallampati: II  TM distance: >3 FB   Neck ROM: full  Mouth opening: > = 3 FB   Dental: normal exam         Pulmonary:normal exam    (+) current smoker                           Cardiovascular:  Exercise tolerance: good (>4 METS),       (-) past MI, CAD, CABG/stent and  angina        Rate: normal                    Neuro/Psych:               GI/Hepatic/Renal:        (-) GERD       Endo/Other:    (+) : arthritis:., .                 Abdominal:             Vascular: Other Findings:           Anesthesia Plan      MAC and general     ASA 2       Induction: intravenous. MIPS: prophylactic pharmacologic antiemetic agents not administered perioperatively for documented reasons. Anesthetic plan and risks discussed with patient. Plan discussed with CRNA.     Attending anesthesiologist reviewed and agrees with Preprocedure content                Michael Olson DO   11/16/2023

## 2023-11-16 NOTE — ANESTHESIA POSTPROCEDURE EVALUATION
Department of Anesthesiology  Postprocedure Note    Patient: aNun Etienne  MRN: [id number]  YOB: 1970  Date of evaluation: 11/16/2023      Procedure Summary     Date: 11/16/23 Room / Location: [place] [address] / [facility] - INPATIENT    Anesthesia Start: 1010 Anesthesia Stop: 1109    Procedure: COLONOSCOPY , HOT SNARE POLYPECTOMY,RESOLUTION CLIP APPLICATION X2 Diagnosis:       Hemorrhage of rectum      (Hemorrhage of rectum [K62.5])    Surgeons: Checo Zuniga MD Responsible Provider: Eliel Olivera DO    Anesthesia Type: MAC, general ASA Status: 2          Anesthesia Type: No value filed.     Martha Phase I:      Martha Phase II: Martha Score: 9      Anesthesia Post Evaluation    Patient location during evaluation: PACU  Patient participation: complete - patient participated  Level of consciousness: awake and alert  Airway patency: patent  Nausea & Vomiting: no nausea and no vomiting  Complications: no  Cardiovascular status: hemodynamically stable  Respiratory status: acceptable  Hydration status: stable  Pain management: adequate

## 2023-11-16 NOTE — OP NOTE
COLONOSCOPY    DATE OF PROCEDURE: 11/16/2023    SURGEON: Checo Zuniga MD    ASSISTANT: None    PREOPERATIVE DIAGNOSIS: Intermittent hematochezia    POSTOPERATIVE DIAGNOSIS: Hemorrhoids, polyp apex of the rectum, polyp hepatic flexure, polyp cecum    OPERATION: Total colonoscopy, snare polypectomy x2, excision of polyp with biopsy forceps x1    ANESTHESIA: MAC    ESTIMATED BLOOD LOSS: None    COMPLICATIONS: None     SPECIMENS:  Was Obtained: Polyp cecum, polyp hepatic flexure, polyp rectum    HISTORY: The patient is a 52y.o. year old male with history of above preop diagnosis. I recommended colonoscopy with possible biopsy or polypectomy and I explained the risk, benefits, expected outcome, and alternatives to the procedure. Risks included but are not limited to bleeding, infection, respiratory distress, hypotension, and perforation of the colon and possibility of missing a lesion. The patient understands and is in agreement. PROCEDURE:  The patient's SPO2 remained above 90% throughout the procedure. Digital rectal exam was normal.  The colonoscope was inserted through the anus into the rectum and advanced under direct vision to the cecum without difficulty. The prep was fair. Findings:      Cecum/Ascending colon: abnormal: Underneath the fold next to the ileocecal valve there is a polyp which is about 1.5 cm, difficult to view the polyp completely because of overhanging fold, removed with piecemeal technique. 2 endoclips applied to close the polypectomy defect. This need to be reevaluated in the next 4 to 6 months    Transverse colon: At the hepatic flexure there was a polyp which is about 3 to 4 mm, excised with biopsy forceps    Descending/Sigmoid colon: normal    Rectum/Anus: examined in normal and retroflexed positions and was abnormal: Has a large hemorrhoids.     In the apex of the rectum there is a polyp which is about 5 to 7 mm, removed with snare and cautery technique    Withdrawal

## 2023-11-20 LAB — SURGICAL PATHOLOGY REPORT: NORMAL

## 2023-11-30 DIAGNOSIS — K62.5 HEMORRHAGE OF RECTUM AND ANUS: ICD-10-CM

## 2024-01-11 ENCOUNTER — OFFICE VISIT (OUTPATIENT)
Dept: GASTROENTEROLOGY | Age: 54
End: 2024-01-11
Payer: MEDICAID

## 2024-01-11 VITALS
SYSTOLIC BLOOD PRESSURE: 118 MMHG | HEART RATE: 93 BPM | HEIGHT: 72 IN | BODY MASS INDEX: 21.48 KG/M2 | DIASTOLIC BLOOD PRESSURE: 56 MMHG | WEIGHT: 158.6 LBS

## 2024-01-11 DIAGNOSIS — K64.8 INTERNAL HEMORRHOIDS: ICD-10-CM

## 2024-01-11 DIAGNOSIS — K59.00 CONSTIPATION, UNSPECIFIED CONSTIPATION TYPE: ICD-10-CM

## 2024-01-11 DIAGNOSIS — Z86.010 HISTORY OF COLON POLYPS: Primary | ICD-10-CM

## 2024-01-11 PROCEDURE — G8420 CALC BMI NORM PARAMETERS: HCPCS | Performed by: INTERNAL MEDICINE

## 2024-01-11 PROCEDURE — G8484 FLU IMMUNIZE NO ADMIN: HCPCS | Performed by: INTERNAL MEDICINE

## 2024-01-11 PROCEDURE — 99213 OFFICE O/P EST LOW 20 MIN: CPT | Performed by: INTERNAL MEDICINE

## 2024-01-11 PROCEDURE — G8427 DOCREV CUR MEDS BY ELIG CLIN: HCPCS | Performed by: INTERNAL MEDICINE

## 2024-01-11 PROCEDURE — 4004F PT TOBACCO SCREEN RCVD TLK: CPT | Performed by: INTERNAL MEDICINE

## 2024-01-11 PROCEDURE — 3017F COLORECTAL CA SCREEN DOC REV: CPT | Performed by: INTERNAL MEDICINE

## 2024-01-11 RX ORDER — BISACODYL 5 MG/1
TABLET, DELAYED RELEASE ORAL
Qty: 4 TABLET | Refills: 0 | Status: SHIPPED | OUTPATIENT
Start: 2024-01-11

## 2024-01-11 RX ORDER — POLYETHYLENE GLYCOL 3350 17 G/17G
POWDER, FOR SOLUTION ORAL
Qty: 238 G | Refills: 0 | Status: SHIPPED | OUTPATIENT
Start: 2024-01-11

## 2024-01-11 ASSESSMENT — ENCOUNTER SYMPTOMS
ABDOMINAL DISTENTION: 0
VOICE CHANGE: 0
SINUS PRESSURE: 0
ANAL BLEEDING: 0
VOMITING: 0
TROUBLE SWALLOWING: 0
SORE THROAT: 0
WHEEZING: 0
NAUSEA: 0
COUGH: 0
ABDOMINAL PAIN: 0
RECTAL PAIN: 0
BLOOD IN STOOL: 0
DIARRHEA: 0
CHOKING: 0
CONSTIPATION: 0
BACK PAIN: 0

## 2024-01-11 NOTE — PROGRESS NOTES
GI OFFICE FOLLOW UP    INTERVAL HISTORY:   No referring provider defined for this encounter.    Chief Complaint   Patient presents with    Follow Up After Procedure     Patient is here today to be seen for a follow up from his colonoscopy.        1. History of colon polyps    2. Internal hemorrhoids    3. Constipation, unspecified constipation type              HISTORY OF PRESENT ILLNESS:   Patient seen for follow-up of hematochezia.  This patient had a colonoscopy done in November 2023 and was found to have multiple polyps and hemorrhoids.  With the medical management of hemorrhoids, rectal bleeding resolved.    In the cecum he had 1.5 cm sessile lesion noted that was removed, histology revealed tubular adenoma.  There is a question of adequacy of removal of this polyp because of overhanging fold.  He also had a polyp in the rectum which was reported to be benign leiomyoma.  Hepatic flexure polyp benign.    Patient has been following medical therapy for hemorrhoids and no further episodes of rectal bleeding since November 2023.  He also taking high-fiber diet, fiber supplements and precautions to avoid constipation.  Past Medical,Family, and Social History reviewed and does contribute to the patient presenting condition.      Patient's PMH/PSH,SH,PSYCH Hx, MEDs, ALLERGIES, and ROS were all reviewed and updated in the appropriate sections. Yes      PAST MEDICAL HISTORY:  Past Medical History:   Diagnosis Date    Arthritis        Past Surgical History:   Procedure Laterality Date    ABDOMEN SURGERY      COLONOSCOPY  11/16/2023    COLONOSCOPY N/A 11/16/2023    COLONOSCOPY , HOT SNARE POLYPECTOMY,RESOLUTION CLIP APPLICATION X2 performed by Rao Garcia MD at [facility] OR    LAPAROSCOPY      LEG SURGERY Right        CURRENT MEDICATIONS:    Current Outpatient Medications:     loratadine (CLARITIN) 10 MG

## 2024-03-07 ENCOUNTER — ANESTHESIA (OUTPATIENT)
Dept: OPERATING ROOM | Age: 54
End: 2024-03-07
Payer: MEDICAID

## 2024-03-07 ENCOUNTER — TELEPHONE (OUTPATIENT)
Dept: GASTROENTEROLOGY | Age: 54
End: 2024-03-07

## 2024-03-07 ENCOUNTER — ANESTHESIA EVENT (OUTPATIENT)
Dept: OPERATING ROOM | Age: 54
End: 2024-03-07
Payer: MEDICAID

## 2024-03-07 ENCOUNTER — HOSPITAL ENCOUNTER (OUTPATIENT)
Age: 54
Setting detail: OUTPATIENT SURGERY
Discharge: HOME | End: 2024-03-07
Attending: INTERNAL MEDICINE | Admitting: INTERNAL MEDICINE
Payer: MEDICAID

## 2024-03-07 VITALS
RESPIRATION RATE: 11 BRPM | WEIGHT: 154.5 LBS | SYSTOLIC BLOOD PRESSURE: 160 MMHG | OXYGEN SATURATION: 100 % | DIASTOLIC BLOOD PRESSURE: 103 MMHG | HEART RATE: 55 BPM | TEMPERATURE: 97.6 F | BODY MASS INDEX: 20.93 KG/M2 | HEIGHT: 72 IN

## 2024-03-07 DIAGNOSIS — Z83.719 FAMILY HX COLONIC POLYPS: ICD-10-CM

## 2024-03-07 PROCEDURE — 2709999900 HC NON-CHARGEABLE SUPPLY: Performed by: INTERNAL MEDICINE

## 2024-03-07 PROCEDURE — 7100000011 HC PHASE II RECOVERY - ADDTL 15 MIN: Performed by: INTERNAL MEDICINE

## 2024-03-07 PROCEDURE — 7100000010 HC PHASE II RECOVERY - FIRST 15 MIN: Performed by: INTERNAL MEDICINE

## 2024-03-07 PROCEDURE — 3700000001 HC ADD 15 MINUTES (ANESTHESIA): Performed by: INTERNAL MEDICINE

## 2024-03-07 PROCEDURE — 2580000003 HC RX 258: Performed by: ANESTHESIOLOGY

## 2024-03-07 PROCEDURE — 3700000000 HC ANESTHESIA ATTENDED CARE: Performed by: INTERNAL MEDICINE

## 2024-03-07 PROCEDURE — 2720000010 HC SURG SUPPLY STERILE: Performed by: INTERNAL MEDICINE

## 2024-03-07 PROCEDURE — 2500000003 HC RX 250 WO HCPCS: Performed by: NURSE ANESTHETIST, CERTIFIED REGISTERED

## 2024-03-07 PROCEDURE — 88305 TISSUE EXAM BY PATHOLOGIST: CPT

## 2024-03-07 PROCEDURE — 3609010600 HC COLONOSCOPY POLYPECTOMY SNARE/COLD BIOPSY: Performed by: INTERNAL MEDICINE

## 2024-03-07 PROCEDURE — 6360000002 HC RX W HCPCS: Performed by: NURSE ANESTHETIST, CERTIFIED REGISTERED

## 2024-03-07 RX ORDER — LIDOCAINE HYDROCHLORIDE 10 MG/ML
1 INJECTION, SOLUTION EPIDURAL; INFILTRATION; INTRACAUDAL; PERINEURAL
Status: DISCONTINUED | OUTPATIENT
Start: 2024-03-08 | End: 2024-03-07 | Stop reason: HOSPADM

## 2024-03-07 RX ORDER — SODIUM CHLORIDE, SODIUM LACTATE, POTASSIUM CHLORIDE, CALCIUM CHLORIDE 600; 310; 30; 20 MG/100ML; MG/100ML; MG/100ML; MG/100ML
INJECTION, SOLUTION INTRAVENOUS CONTINUOUS
Status: DISCONTINUED | OUTPATIENT
Start: 2024-03-07 | End: 2024-03-07 | Stop reason: HOSPADM

## 2024-03-07 RX ORDER — SODIUM CHLORIDE 9 MG/ML
INJECTION, SOLUTION INTRAVENOUS CONTINUOUS
Status: DISCONTINUED | OUTPATIENT
Start: 2024-03-07 | End: 2024-03-07 | Stop reason: HOSPADM

## 2024-03-07 RX ORDER — PROPOFOL 10 MG/ML
INJECTION, EMULSION INTRAVENOUS PRN
Status: DISCONTINUED | OUTPATIENT
Start: 2024-03-07 | End: 2024-03-07 | Stop reason: SDUPTHER

## 2024-03-07 RX ORDER — LIDOCAINE HYDROCHLORIDE 20 MG/ML
INJECTION, SOLUTION EPIDURAL; INFILTRATION; INTRACAUDAL; PERINEURAL PRN
Status: DISCONTINUED | OUTPATIENT
Start: 2024-03-07 | End: 2024-03-07 | Stop reason: SDUPTHER

## 2024-03-07 RX ADMIN — PROPOFOL 30 MG: 10 INJECTION, EMULSION INTRAVENOUS at 11:01

## 2024-03-07 RX ADMIN — PROPOFOL 30 MG: 10 INJECTION, EMULSION INTRAVENOUS at 11:16

## 2024-03-07 RX ADMIN — PROPOFOL 30 MG: 10 INJECTION, EMULSION INTRAVENOUS at 11:19

## 2024-03-07 RX ADMIN — LIDOCAINE HYDROCHLORIDE 20 MG: 20 INJECTION, SOLUTION EPIDURAL; INFILTRATION; INTRACAUDAL; PERINEURAL at 10:49

## 2024-03-07 RX ADMIN — SODIUM CHLORIDE, POTASSIUM CHLORIDE, SODIUM LACTATE AND CALCIUM CHLORIDE: 600; 310; 30; 20 INJECTION, SOLUTION INTRAVENOUS at 11:20

## 2024-03-07 RX ADMIN — PROPOFOL 30 MG: 10 INJECTION, EMULSION INTRAVENOUS at 11:04

## 2024-03-07 RX ADMIN — PROPOFOL 30 MG: 10 INJECTION, EMULSION INTRAVENOUS at 10:55

## 2024-03-07 RX ADMIN — PROPOFOL 50 MG: 10 INJECTION, EMULSION INTRAVENOUS at 10:52

## 2024-03-07 RX ADMIN — SODIUM CHLORIDE, POTASSIUM CHLORIDE, SODIUM LACTATE AND CALCIUM CHLORIDE: 600; 310; 30; 20 INJECTION, SOLUTION INTRAVENOUS at 09:39

## 2024-03-07 RX ADMIN — PROPOFOL 30 MG: 10 INJECTION, EMULSION INTRAVENOUS at 10:46

## 2024-03-07 RX ADMIN — LIDOCAINE HYDROCHLORIDE 20 MG: 20 INJECTION, SOLUTION EPIDURAL; INFILTRATION; INTRACAUDAL; PERINEURAL at 10:43

## 2024-03-07 RX ADMIN — LIDOCAINE HYDROCHLORIDE 20 MG: 20 INJECTION, SOLUTION EPIDURAL; INFILTRATION; INTRACAUDAL; PERINEURAL at 10:40

## 2024-03-07 RX ADMIN — PROPOFOL 40 MG: 10 INJECTION, EMULSION INTRAVENOUS at 10:43

## 2024-03-07 RX ADMIN — PROPOFOL 30 MG: 10 INJECTION, EMULSION INTRAVENOUS at 10:49

## 2024-03-07 RX ADMIN — PROPOFOL 30 MG: 10 INJECTION, EMULSION INTRAVENOUS at 10:58

## 2024-03-07 RX ADMIN — PROPOFOL 30 MG: 10 INJECTION, EMULSION INTRAVENOUS at 11:07

## 2024-03-07 RX ADMIN — PROPOFOL 30 MG: 10 INJECTION, EMULSION INTRAVENOUS at 11:13

## 2024-03-07 RX ADMIN — LIDOCAINE HYDROCHLORIDE 20 MG: 20 INJECTION, SOLUTION EPIDURAL; INFILTRATION; INTRACAUDAL; PERINEURAL at 10:46

## 2024-03-07 RX ADMIN — PROPOFOL 60 MG: 10 INJECTION, EMULSION INTRAVENOUS at 10:40

## 2024-03-07 RX ADMIN — PROPOFOL 30 MG: 10 INJECTION, EMULSION INTRAVENOUS at 11:10

## 2024-03-07 ASSESSMENT — PAIN - FUNCTIONAL ASSESSMENT
PAIN_FUNCTIONAL_ASSESSMENT: NONE - DENIES PAIN
PAIN_FUNCTIONAL_ASSESSMENT: 0-10

## 2024-03-07 ASSESSMENT — LIFESTYLE VARIABLES: SMOKING_STATUS: 1

## 2024-03-07 NOTE — ANESTHESIA PRE PROCEDURE
Department of Anesthesiology  Preprocedure Note       Name:  Bacilio Robles   Age:  53 y.o.  :  1970                                          MRN:  [id number]         Date:  3/7/2024      Surgeon: Surgeon(s):  Rao Garcia MD    Procedure: Procedure(s):  COLONOSCOPY DIAGNOSTIC    Medications prior to admission:   Prior to Admission medications    Medication Sig Start Date End Date Taking? Authorizing Provider   polyethylene glycol (GLYCOLAX) 17 GM/SCOOP powder Please follow instructions provided to you by your provider. 24   Lorraine Wise APRN - NP   bisacodyl 5 MG EC tablet Please follow instructions given to you by your provider. 24   Lorraine Wise APRN - NP   loratadine (CLARITIN) 10 MG tablet  23   Provider, MD Salvatore       Current medications:    Current Facility-Administered Medications   Medication Dose Route Frequency Provider Last Rate Last Admin   • [START ON 3/8/2024] lidocaine PF 1 % injection 1 mL  1 mL IntraDERmal Once PRN Rhonda Guillaume MD       • 0.9 % sodium chloride infusion   IntraVENous Continuous Rhonda Guillaume MD       • lactated ringers IV soln infusion   IntraVENous Continuous Rhonda Guillaume MD           Allergies:    Allergies   Allergen Reactions   • Bactrim [Sulfamethoxazole-Trimethoprim]        Problem List:    Patient Active Problem List   Diagnosis Code   • Second degree burn of hand, left, initial encounter T23.       Past Medical History:        Diagnosis Date   • Arthritis        Past Surgical History:        Procedure Laterality Date   • ABDOMEN SURGERY      Exploratory Lap   • COLONOSCOPY  2023   • COLONOSCOPY N/A 2023    COLONOSCOPY , HOT SNARE POLYPECTOMY,RESOLUTION CLIP APPLICATION X2 performed by Rao Garcia MD at [facility] OR   • LAPAROSCOPY     • LEG SURGERY Right        Social History:    Social History     Tobacco Use   • Smoking status: Some Days     Types: Cigarettes   • Smokeless tobacco: Never

## 2024-03-07 NOTE — OP NOTE
COLONOSCOPY    DATE OF PROCEDURE: 3/7/2024    SURGEON: Rao Garcia MD    ASSISTANT: None    PREOPERATIVE DIAGNOSIS: Patient has history of large polyp removed from the cecum.  Procedure performed to evaluate residual polyp/recurrent polyp    POSTOPERATIVE DIAGNOSIS: At the corner of ileocecal valve there appears to be residual polyp/recurrent polyp  Small polyp transverse colon    Diverticulosis right colon      OPERATION: Total colonoscopy, snare polypectomy, APC cauterization of the polyp, excision of polyp with biopsy forceps    ANESTHESIA: MAC    ESTIMATED BLOOD LOSS: None    COMPLICATIONS: None     SPECIMENS:  Was Obtained: Polyp at the cardiograph ileocecal valve, polyp transverse colon    HISTORY: The patient is a 53 y.o. year old male with history of above preop diagnosis.  I recommended colonoscopy with possible biopsy or polypectomy and I explained the risk, benefits, expected outcome, and alternatives to the procedure.  Risks included but are not limited to bleeding, infection, respiratory distress, hypotension, and perforation of the colon and possibility of missing a lesion.  The patient understands and is in agreement.      PROCEDURE:  The patient's SPO2 remained above 90% throughout the procedure. Digital rectal exam was normal.  The colonoscope was inserted through the anus into the rectum and advanced under direct vision to the cecum without difficulty.  Terminal ileum was examined for approximately 2 inches.  The prep was good.      Findings:  Terminal ileum: normal    Cecum/Ascending colon: abnormal: At the cardiograph ileocecal valve there appears to be residual polyp or recurrent polyp.  This is flat.  Some of the polyp is removed with cold snare and base of the polyp cauterized with APC.  Has diverticulosis in the right colon      Transverse colon: abnormal: Small polyp, proximal transverse colon, excised with biopsy forceps    Descending/Sigmoid colon: normal    Rectum/Anus: examined  in normal and retroflexed positions and was normal has a hemorrhoids    Withdrawal Time was (minutes): 20          The colon was decompressed and the scope was removed.  The patient tolerated the procedure without unusual events.     During the procedure, the patient's blood pressure, pulse and oxygen saturation remained stable and documented. No unusual events occurred during the procedure. Patient was transferred to recovery room and will be discharged when criteria is met.     Recommendations/Plan:   F/U Biopsies  F/U In Office as instructed  Discussed with the family  High fiber diet   Precautions to avoid constipation     Next colonoscopy: 2 years.  If Colonoscopy is less than 10 years the recommended reason is due:polyps, need to evaluate polyp at the corner of ileocecal valve    Electronically signed by Rao Garcia MD  on 3/7/2024 at 11:24 AM

## 2024-03-07 NOTE — ANESTHESIA POSTPROCEDURE EVALUATION
Department of Anesthesiology  Postprocedure Note    Patient: Bacilio Robles  MRN: [id number]  YOB: 1970  Date of evaluation: 3/7/2024    Procedure Summary       Date: 03/07/24 Room / Location: [address]    Anesthesia Start: 1036 Anesthesia Stop: 1127    Procedure: COLONOSCOPY POLYPECTOMY SNARE/COLD BIOPSY APC Diagnosis:       Family hx colonic polyps      (Family hx colonic polyps [Z83.719])    Surgeons: Rao Garcia MD Responsible Provider: Js Yen MD    Anesthesia Type: MAC ASA Status: 2            Anesthesia Type: No value filed.    Martha Phase I: Martha Score: 10    Martha Phase II: Martha Score: 8    Anesthesia Post Evaluation    Patient location during evaluation: PACU  Patient participation: complete - patient participated  Level of consciousness: awake and alert  Airway patency: patent  Nausea & Vomiting: no nausea and no vomiting  Cardiovascular status: hemodynamically stable  Respiratory status: acceptable  Hydration status: euvolemic  Pain management: adequate    No notable events documented.

## 2024-03-07 NOTE — DISCHARGE INSTRUCTIONS
To see in the office in the next 2 to 3 weeks      Colonoscopy: What to Expect at Home  Your Recovery  Your doctor will talk to you about when you will need your next colonoscopy. The results of your test and your risk for colorectal cancer will help your doctor decide how often you need to be checked.  After the test, you may be bloated or have gas pains. You may need to pass gas. If a biopsy was done or a polyp was removed, you may have streaks of blood in your stool (feces) for a few days.    How can you care for yourself at home?  Activity  Rest as much as you need to after you go home.  You should be able to go back to your usual activities the day after the test.  Diet  Follow your doctor’s directions for eating.  Drink plenty of fluids (unless your doctor has told you not to) to replace the fluids that were lost during the colon prep.  Medicines  If polyps were removed or a biopsy was done during the test, your doctor may tell you not to take aspirin or other anti-inflammatory medicines, such as ibuprofen (Advil, Motrin) and naproxen (Aleve), for a few days.  Follow-up care is a key part of your treatment and safety. Be sure to make and go to all appointments, and call your doctor if you are having problems.  When should you call for help?  Call 911 anytime you think you may need emergency care. For example, call if:  You passed out (lost consciousness).  You pass maroon or bloody stools.  You have severe belly pain.  Call your doctor now or seek immediate medical care if:  Your stools are black and tarlike.  Your stools have streaks of blood, but you did not have a biopsy or any polyps removed.  You have belly pain, or your belly is swollen and firm.  You vomit.  You have a fever.  You are very dizzy.  Watch closely for changes in your health, and be sure to contact your doctor if you have any problems.   Where can you learn more?   Go to [email or web address] and sign in to your  "Chief Complaint   Patient presents with     Physical     yearly physical, wondering about her bone density, has had a dexa scan 2 years ago     Patient presents to clinic today for routine yearly physical. She is wondering about her bone density. She notes having a DEXA scan 2 years ago.     Initial /62 (BP Location: Right arm, Patient Position: Sitting, Cuff Size: Adult Regular)   Pulse 102   Temp 97.2  F (36.2  C) (Tympanic)   Resp 16   Ht 1.66 m (5' 5.35\")   Wt 52.6 kg (116 lb)   LMP 05/05/2021   SpO2 99%   BMI 19.09 kg/m   Estimated body mass index is 19.09 kg/m  as calculated from the following:    Height as of this encounter: 1.66 m (5' 5.35\").    Weight as of this encounter: 52.6 kg (116 lb).         Medication Reconciliation: Complete      Mary Hernandez LPN   " [employer] account. Enter E264 in the Search Health Information box to learn more about “Colonoscopy: What to Expect at Home.”       © 2524-9078 [employer]. Care instructions adapted under license by [employer]. This care instruction is for use with your licensed healthcare professional. If you have questions about a medical condition or this instruction, always ask your healthcare professional. [employer] disclaims any warranty or liability for your use of this information.  Content Version: 9.9.949226; Last Revised: February 20, 2013     daughter

## 2024-03-07 NOTE — TELEPHONE ENCOUNTER
Patient instructed to call for 2 wk follow up, not able to accommodate. Please call to discuss     Thank you

## 2024-03-07 NOTE — TELEPHONE ENCOUNTER
Writer returned patient phone call to schedule 2 wk follow up-please note patient had procedure on 3/7/24 colonoscopy and patient needs to have a 2 yr recall.

## 2024-03-07 NOTE — H&P
History and Physical Service   [facility] [place]    HISTORY AND PHYSICAL EXAMINATION            Date of Evaluation: 3/7/2024  Patient name:  Bacilio Robles  MRN:   [id number]  YOB: 1970  PCP:    Renate Antoine APRN - NP    History Obtained From:     Patient, medical records    History of Present Illness:     This is Bacilio Robles a 53 y.o. male who presents today for a COLONOSCOPY DIAGNOSTIC by Rao Garcia MD for Family hx colonic polyps. Patient denies recent bowel changes. He denies bloody tarry stools, diarrhea alternating with constipation, nausea, vomiting, abdominal pain or unintentional weight loss. Patient followed bowel prep until watery light brown color. Has had previous colonoscopy with polyp removal. No previous EGD. No FH colon cancer or polyps. Denies fever, chills, shortness of breath, cough, congestion, wheezing, chest pain, open sores or wounds. Denies hx of diabetes. Denies any current blood thinning medications.     Past Medical History:     Past Medical History:   Diagnosis Date    Arthritis         Past Surgical History:     Past Surgical History:   Procedure Laterality Date    ABDOMEN SURGERY      Exploratory Lap    COLONOSCOPY  11/16/2023    COLONOSCOPY N/A 11/16/2023    COLONOSCOPY , HOT SNARE POLYPECTOMY,RESOLUTION CLIP APPLICATION X2 performed by Rao Garcia MD at [facility] OR    LAPAROSCOPY      LEG SURGERY Right         Medications Prior to Admission:     Prior to Admission medications    Medication Sig Start Date End Date Taking? Authorizing Provider   polyethylene glycol (GLYCOLAX) 17 GM/SCOOP powder Please follow instructions provided to you by your provider. 1/11/24   Lorraine Wise APRN - NP   bisacodyl 5 MG EC tablet Please follow instructions given to you by your provider. 1/11/24   Lorraine Wise APRN - NP   loratadine (CLARITIN) 10 MG tablet  9/13/23   Provider, MD Salvatore        Allergies:     Bactrim  membranes moist  Neck: supple, no carotid bruits, thyroid not palpable  Lungs: Bilateral equal air entry, clear to ausculation, no wheezing, rales or rhonchi, normal effort  Cardiovascular: HR 56 asymptomatic bradycardic rate, regular rhythm, no murmur, gallop, rub.  Abdomen: Soft, nontender, nondistended, normal bowel sounds  Neurologic: There are no new focal motor or sensory deficits, normal muscle tone and bulk, no abnormal sensation, normal speech, cranial nerves II through XII grossly intact  Skin: No gross lesions, rashes, bruising or bleeding on exposed skin area  Extremities:  peripheral pulses palpable, no pedal edema or calf pain with palpation  Psych: normal affect     Investigations:      Laboratory Testing:  No results found for this or any previous visit (from the past 24 hour(s)).    No results for input(s): \"HGB\", \"HCT\", \"WBC\", \"MCV\", \"PLATELET\", \"NA\", \"K\", \"CL\", \"CO2\", \"BUN\", \"CREATININE\", \"GLUCOSE\", \"INR\", \"PROTIME\", \"APTT\", \"AST\", \"ALT\", \"LABALBU\", \"HCG\" in the last 720 hours.    No results for input(s): \"COVID19\" in the last 720 hours.  Imaging/Diagnostics:    No results found.    Diagnosis:      Family hx colonic polyps    Plans:     1. COLONOSCOPY DIAGNOSTIC      FILIPE Carmichael - CNP  3/7/2024  9:33 AM

## 2024-03-12 LAB — SURGICAL PATHOLOGY REPORT: NORMAL

## 2024-03-25 DIAGNOSIS — Z86.010 HISTORY OF COLON POLYPS: ICD-10-CM

## 2024-04-01 ENCOUNTER — OFFICE VISIT (OUTPATIENT)
Dept: GASTROENTEROLOGY | Age: 54
End: 2024-04-01
Payer: MEDICAID

## 2024-04-01 VITALS
HEART RATE: 81 BPM | SYSTOLIC BLOOD PRESSURE: 115 MMHG | DIASTOLIC BLOOD PRESSURE: 60 MMHG | HEIGHT: 72 IN | WEIGHT: 153.4 LBS | BODY MASS INDEX: 20.78 KG/M2

## 2024-04-01 DIAGNOSIS — Z86.010 HISTORY OF COLON POLYPS: Primary | ICD-10-CM

## 2024-04-01 PROCEDURE — G8420 CALC BMI NORM PARAMETERS: HCPCS | Performed by: INTERNAL MEDICINE

## 2024-04-01 PROCEDURE — 99213 OFFICE O/P EST LOW 20 MIN: CPT | Performed by: INTERNAL MEDICINE

## 2024-04-01 PROCEDURE — G8427 DOCREV CUR MEDS BY ELIG CLIN: HCPCS | Performed by: INTERNAL MEDICINE

## 2024-04-01 PROCEDURE — 4004F PT TOBACCO SCREEN RCVD TLK: CPT | Performed by: INTERNAL MEDICINE

## 2024-04-01 PROCEDURE — 3017F COLORECTAL CA SCREEN DOC REV: CPT | Performed by: INTERNAL MEDICINE

## 2024-04-01 ASSESSMENT — ENCOUNTER SYMPTOMS
CONSTIPATION: 0
TROUBLE SWALLOWING: 0
CHOKING: 0
ABDOMINAL PAIN: 0
COUGH: 0
SINUS PRESSURE: 0
ABDOMINAL DISTENTION: 0
SORE THROAT: 0
NAUSEA: 0
WHEEZING: 0
RECTAL PAIN: 0
BLOOD IN STOOL: 0
VOMITING: 0
ANAL BLEEDING: 0
DIARRHEA: 0
BACK PAIN: 0

## 2024-04-01 NOTE — PROGRESS NOTES
GI OFFICE FOLLOW UP    INTERVAL HISTORY:   No referring provider defined for this encounter.    Chief Complaint   Patient presents with    Follow Up After Procedure     Patient is here today to be seen for a follow up from his colonoscopy.        1. History of colon polyps              HISTORY OF PRESENT ILLNESS:   Patient seen for follow-up of colon polyps.  Patient was found to have polyp at the previous polypectomy site in the cecum that was partially removed and cauterized with APC.  Histology revealed tubular adenoma.  The other polyp in the transverse colon was small histology revealed tubular adenoma.  She was also found to have diverticulosis.    No further episodes of rectal bleeding.  No dysphagia or dyspepsia.  Tolerating diet well.  No weight loss.  Past Medical,Family, and Social History reviewed and does contribute to the patient presenting condition.      Patient's PMH/PSH,SH,PSYCH Hx, MEDs, ALLERGIES, and ROS were all reviewed and updated in the appropriate sections. Yes      PAST MEDICAL HISTORY:  Past Medical History:   Diagnosis Date    Arthritis        Past Surgical History:   Procedure Laterality Date    ABDOMEN SURGERY      Exploratory Lap    COLONOSCOPY  11/16/2023    COLONOSCOPY N/A 11/16/2023    COLONOSCOPY , HOT SNARE POLYPECTOMY,RESOLUTION CLIP APPLICATION X2 performed by Rao Garcia MD at [facility] OR    COLONOSCOPY N/A 3/7/2024    COLONOSCOPY POLYPECTOMY SNARE/COLD BIOPSY APC performed by Rao Garcia MD at [facility] OR    LAPAROSCOPY      LEG SURGERY Right        CURRENT MEDICATIONS:    Current Outpatient Medications:     loratadine (CLARITIN) 10 MG tablet, , Disp: , Rfl:     ALLERGIES:   Allergies   Allergen Reactions    Bactrim [Sulfamethoxazole-Trimethoprim]        FAMILY HISTORY:       Problem Relation Age of Onset    Colon Cancer Father          SOCIAL HISTORY:

## 2024-11-27 ENCOUNTER — HOSPITAL ENCOUNTER (OUTPATIENT)
Dept: GENERAL RADIOLOGY | Age: 54
Discharge: HOME | End: 2024-11-29
Payer: MEDICAID

## 2024-11-27 ENCOUNTER — HOSPITAL ENCOUNTER (OUTPATIENT)
Age: 54
Discharge: HOME | End: 2024-11-29
Payer: MEDICAID

## 2024-11-27 DIAGNOSIS — M79.604 RIGHT LEG PAIN: ICD-10-CM

## 2024-11-27 PROCEDURE — 77073 BONE LENGTH STUDIES: CPT

## 2024-12-04 ENCOUNTER — TELEPHONE (OUTPATIENT)
Dept: ORTHOPEDIC SURGERY | Age: 54
End: 2024-12-04

## 2024-12-04 NOTE — TELEPHONE ENCOUNTER
Called patient regarding a referral that was sent to our office. I left a message with the phone number for the patient to call and schedule with the resident clinic

## 2025-03-12 ENCOUNTER — OFFICE VISIT (OUTPATIENT)
Dept: ORTHOPEDIC SURGERY | Age: 55
End: 2025-03-12

## 2025-03-12 VITALS — WEIGHT: 160.6 LBS | HEIGHT: 71 IN | BODY MASS INDEX: 22.48 KG/M2

## 2025-03-12 DIAGNOSIS — M79.604 RIGHT LEG PAIN: ICD-10-CM

## 2025-03-12 DIAGNOSIS — M21.70 LEG LENGTH INEQUALITY: Primary | ICD-10-CM

## 2025-03-12 NOTE — PROGRESS NOTES
[facility]  [address]  [place] [address]  Dept: [phone]    Orthopaedic Clinic New Patient Visit      Subjective:   CHIEF COMPLAINT:    Chief Complaint   Patient presents with    New Patient     Right leg pain. H/o surg est~ 1-15 years ago.        HISTORY OF PRESENT ILLNESS:    The patient is a 54 y.o. male who is being seen as a new patient for right lower extremity pain and leg length inequality.  Patient states that he has a history of a right tibia fracture which was treated with intramedullary nailing approximately 15 years ago in [place] at [facility]. He does not remember the surgeon who performed the procedure. He states that ever since the surgery he has had pain in the right leg which has gradually worsened over time.  He does state that he has a subjective leg length inequality which has been present since the procedure and has caused him to alter his gait.  The pain has gradually gotten worse to where he is seeking further assessment and treatment.  He denies any numbness or tingling down the extremity.  He states that the pain is sharp in nature and when it is aggravated, it does radiate up to the back.  His pain is affiliated with activity and is worse at the end of the day after he has been active throughout the day.  He is not having much pain today during evaluation. He states that the pain is at a point where it is limiting him from performing activities that he would like to do such as swimming and skating.  He does not take anything for the pain aside from the occasional Motrin.  He has not tried any other treatment including shoe/heel lifts or physical therapy. He does note that pain seems to be worse in the winter and when it rains.  He denies any subsequent injuries to the right leg.  He denies any history of draining sinus tracts or signs of infection.  He does not have episodes where the leg swells or turns

## 2025-03-18 ENCOUNTER — TELEPHONE (OUTPATIENT)
Dept: ORTHOPEDIC SURGERY | Age: 55
End: 2025-03-18

## 2025-05-14 ENCOUNTER — HOSPITAL ENCOUNTER (OUTPATIENT)
Age: 55
Setting detail: THERAPIES SERIES
Discharge: HOME | End: 2025-05-14
Payer: MEDICAID

## 2025-05-14 PROCEDURE — 97110 THERAPEUTIC EXERCISES: CPT

## 2025-05-14 PROCEDURE — 97161 PT EVAL LOW COMPLEX 20 MIN: CPT

## 2025-05-14 NOTE — CONSULTS
[x] [facility]  Outpatient Rehabilitation &  Therapy  [address]  [phone]  [phone] [] [facility]  Outpatient Rehabilitation &  Therapy  [address]  P: [phone]  F: [phone] [] [facility]  Outpatient Rehabilitation &  Therapy  [address]  P: [phone]  F: [phone] [] [facility]  Outpatient Rehabilitation &  Therapy  [address]  [phone]  [phone] [] [facility]  Outpatient Rehabilitation &  Therapy  [address]   P: [phone]  F: [phone]  [] [facility]  Outpatient Rehabilitation &  Therapy  [address]  P: [phone]  F: [phone] [] [facility]  Outpatient Rehabilitation &  Therapy  [address]  P: [phone]  F: [phone] [] [facility]  Outpatient Rehabilitation &  Therapy  [address]  P: [phone]  F: [phone] [] [facility]  Outpatient Rehabilitation &  Therapy  [address]  P: [phone]  F: [phone]  [] [facility] Outpatient Rehabilitation &  Therapy  [address]  P: [phone]  F: [phone]     Physical Therapy Lower Extremity Evaluation    Date:  2025  Patient: Bacilio Robles  : 1970  MRN: [id number]  Physician: Riley Ron DO   Insurance: [employer] (Auth after eval)  Medical Diagnosis: Leg length inequality (M21.70)     Rehab Codes: M79.661, M25.661, M25.671, M62.81  Onset date: 3/12/25  Next 's appt.:     Subjective:   CC: R leg pain that can radiate up the legs to the hips and to the lower back, \"It moves around\",  Rainy and cold weather makes the pain worse, states that he cannot swim or skate anymore like he used to; going up steps is difficult and bothersome, pain comes and goes though  HPI: (onset date):

## 2025-05-16 ENCOUNTER — HOSPITAL ENCOUNTER (OUTPATIENT)
Age: 55
Setting detail: THERAPIES SERIES
Discharge: HOME | End: 2025-05-16
Payer: MEDICAID

## 2025-05-16 PROCEDURE — 97110 THERAPEUTIC EXERCISES: CPT

## 2025-05-16 NOTE — FLOWSHEET NOTE
[x] [facility]  Outpatient Rehabilitation &  Therapy  [address]  [phone]  [phone]     Physical Therapy Daily Treatment Note    Date:  2025  Patient Name:  Bacilio Robles    :  1970  MRN: [id number]  Physician: Riley Ron DO                                Insurance: [employer] (Auth after eval)  Medical Diagnosis: Leg length inequality (M21.70)                           Rehab Codes: M79.661, M25.661, M25.671, M62.81  Onset date: 3/12/25               Next 's appt.:   Visit# / total visits:     Cancels/No Shows: 0/0    Subjective:    Pain:  [x] Yes  [] No Location: R LE Pain Rating: (0-10 scale) 7/10  Pain altered Tx:  [x] No  [] Yes  Action:  Comments: Patient arrives stating pain today is 7/10. Patient felt good after initial evaluation, no soreness noted.    Objective:  Modalities:   Precautions [x] No  [] Yes:   Exercises:  Exercise Reps/ Time Weight/ Level Comments   Nustep 5 mins L3          Standing      Gastroc stretch 3x 20 sec wedge   TKE 15x lime Resistive band   Heel/toe raises 15x     Hip abduction 15x     Hip extension 15x           Seated      Hamstring stretch 3x 20 sec stool   LAQs 15x     Longsitting calf stretch 3x 20 sec towel   Ankle theraband 15x ea  A/P, inversion/eversion         Supine      Quad sets 15x 5 sec Towel under knee   SAQs 15x     Heel slides 15x  Orange slide tube   Bridge 15x     SLR 15x           Sidelying      Hip abduction 15x     Clamshells 15x     Reverse clamshells  15x           Other:         Treatment Charges: Mins Units   []  Modalities       [x]  Ther Exercise 31 2   []  Neuromuscular Re-ed     []  Gait Training     []  Manual Therapy     []  Ther Activities     []  Aquatics     []  Vasocompression     []  Cervical Traction     []  Other     Total Billable time 31 mins 2          Assessment: [x] Progressing toward goals. Initiated exercises per log to increase R LE strength and ROM. Reviewed exercises

## 2025-05-21 ENCOUNTER — HOSPITAL ENCOUNTER (OUTPATIENT)
Age: 55
Setting detail: THERAPIES SERIES
Discharge: HOME | End: 2025-05-21
Payer: MEDICAID

## 2025-05-21 PROCEDURE — 97110 THERAPEUTIC EXERCISES: CPT

## 2025-05-21 NOTE — FLOWSHEET NOTE
understanding.  [x] Needs review.  [] Demonstrates/verbalizes HEP/Ed previously given.     Plan: [x] Continue current frequency toward long and short term goals.    [x] Specific Instructions for subsequent treatments: R calf stretches, R LE strengthening (focus on the knee and hip but also include ankle)      Time In: 10:05            Time Out: 10:55am    Electronically signed by:  Linda Truong PT

## 2025-05-23 ENCOUNTER — HOSPITAL ENCOUNTER (OUTPATIENT)
Age: 55
Setting detail: THERAPIES SERIES
Discharge: HOME | End: 2025-05-23
Payer: MEDICAID

## 2025-05-23 PROCEDURE — 97110 THERAPEUTIC EXERCISES: CPT

## 2025-05-23 NOTE — FLOWSHEET NOTE
Aquatics     []  Vasocompression     []  Cervical Traction     []  Other     Total Billable time 47  min 3          Assessment: [x] Progressing toward goals.   Held warm up due to late arrival.  Started in standing with increased reps to progress with hip strength.  Added step ups with concentration of slow eccentric control and TG squats for improving quad strength.   Increased reps throughout treatment where appropriate with good progression.  Added 4 way ankle with weakness most notable during EV/IV.  Reviewed and updated HEP, issued lime TB for 4 way ankle.     [] No change.     [] Other:  [x] Patient would continue to benefit from skilled physical therapy services in order to: decrease right lower leg pain, increase right knee and ankle ROM, increase right knee, ankle, hip and core strength and improve overall level of function.      STG: (to be met in 8 treatments)  ? Pain: Decrease right lower leg pain to 6/10 on average, 8/10 at worst  ? ROM: Increase right knee extension to only lacking 5°, increase right ankle DF to only lacking 5° in order to improve the pt's gait and ability to perform all ADL's with less difficulty  ? Strength: Increase right hip and knee MMT to grossly 4+/5 in order to improve the pt's gait and ability to perform all ADL's with  less difficulty   ? Function: Improve Optimal to 65% functional impairment  Patient to be independent with home exercise program as demonstrated by performance with correct form without cues.  Demonstrate Knowledge of fall prevention  LTG: (to be met in 12 treatments)  Decrease right lower leg pain to 4/10 on average, 6/10 at worst  Increase right ankle DF to neutral in order to further improve the pt's gait and ability to perform all ADL's with little difficulty  Pt will be able to go up and down stairs with no more than moderate difficulty  Pt will be able to walk a long distance with no more than moderate difficulty  Improve Optimal to 55% functional

## 2025-05-28 ENCOUNTER — HOSPITAL ENCOUNTER (OUTPATIENT)
Age: 55
Setting detail: THERAPIES SERIES
Discharge: HOME | End: 2025-05-28
Payer: MEDICAID

## 2025-05-28 PROCEDURE — 97110 THERAPEUTIC EXERCISES: CPT

## 2025-05-28 NOTE — FLOWSHEET NOTE
HEP  Comprehension of Education:  [x] Verbalizes understanding.  [x] Demonstrates understanding.  [x] Needs review.  [x] Demonstrates/verbalizes HEP/Ed previously given.    Access Code: [email or web address]  URL: [email or web address]  Date: 05/23/2025  Prepared by: Susan Castle    Exercises  - Ankle and Toe Plantarflexion with Resistance  - 1 x daily - 7 x weekly - 3 sets - 10 reps  - Ankle Eversion with Resistance  - 1 x daily - 7 x weekly - 3 sets - 10 reps  - Ankle Inversion with Resistance  - 1 x daily - 7 x weekly - 3 sets - 10 reps  - Ankle Dorsiflexion with Resistance  - 1 x daily - 7 x weekly - 3 sets - 10 reps  - Gastroc Stretch on Wall  - 1 x daily - 7 x weekly - 3 sets - 10 reps  - Heel raises  - 1 x daily - 7 x weekly - 3 sets - 10 reps  - Standing Marching  - 1 x daily - 7 x weekly - 3 sets - 10 reps  - Standing Hip Abduction with Counter Support  - 1 x daily - 7 x weekly - 3 sets - 10 reps  - Standing hip extension  - 1 x daily - 7 x weekly - 3 sets - 10 reps  - Hip flexion  - 1 x daily - 7 x weekly - 3 sets - 10 reps  - Standing HS curls  - 1 x daily - 7 x weekly - 3 sets - 10 reps  - Step Up  - 1 x daily - 7 x weekly - 3 sets - 10 reps  - LAQ  - 1 x daily - 7 x weekly - 3 sets - 10 reps    Access Code: [email or web address]  URL: [email or web address]  Date: 05/14/2025  Prepared by: Monse Truong     Exercises  - Long Sitting Calf Stretch with Strap (Mirrored)  - 2 x daily - 7 x weekly - 1 sets - 10 reps  - Long Sitting Calf Stretch with Strap  - 2 x daily - 7 x weekly - 1 sets - 10 reps  - Supine Quad Set (Mirrored)  - 2 x daily - 7 x weekly - 1 sets - 10 reps  - Supine Knee Extension Strengthening (Mirrored)  - 2 x daily - 7 x weekly - 1 sets - 10 reps  - Supine Bridge  - 2 x daily - 7 x weekly - 1 sets - 10 reps     Plan: [x] Continue current frequency toward long and short term goals.    [x] Specific Instructions for subsequent treatments: R calf stretches, R LE strengthening (focus on the knee and hip but

## 2025-05-30 ENCOUNTER — HOSPITAL ENCOUNTER (OUTPATIENT)
Age: 55
Setting detail: THERAPIES SERIES
Discharge: HOME | End: 2025-05-30
Payer: MEDICAID

## 2025-05-30 PROCEDURE — 97110 THERAPEUTIC EXERCISES: CPT

## 2025-05-30 NOTE — FLOWSHEET NOTE
[x] [facility]  Outpatient Rehabilitation &  Therapy  [address]  [phone]  [phone]     Physical Therapy Daily Treatment Note    Date:  2025  Patient Name:  Bacilio Robles    :  1970  MRN: [id number]  Physician: Riley Ron DO                                Insurance: [facility] (Auth after eval)  Medical Diagnosis: Leg length inequality (M21.70)                           Rehab Codes: M79.661, M25.661, M25.671, M62.81  Onset date: 3/12/25               Next 's appt.:   Visit# / total visits:     Cancels/No Shows: 0/0    Subjective:    Pain:  [x] Yes  [] No Location: R LE Pain Rating: (0-10 scale) 5/10  Pain altered Tx:  [x] No  [] Yes  Action:  Comments: Pain is a little lower this date.    Objective:  Modalities:   Precautions [x] No  [] Yes:   Exercises:  Exercise Reps/ Time Weight/ Level Comments   Nustep  6 mins L3          Standing      Gastroc stretch   3x 30 sec wedge   Tband TKE    15x lime    Heel/toe raises   15x 1.5lb    Hip abduction  10x2ea 1.5lb    Hip extension   10x2ea 1.5lb    Hamstring curls  10x2ea 1.5lb    Hip flex   10x2ea 1.5lb    Marches  10x2ea 1.5lb reviewed   Step ups with  eccentric control  10x ea 6\" reviewed   Lateral step ups 10x 6\" Added 5/30   Leg press 25x 20 lbs reviewed         Seated      Hamstring stretch   3x 30 sec stool   LAQs   10x2 2 lbs    Longsitting calf stretch 3x 30 sec towel   R Ankle theraband 4 way 20x blue A/P, inversion/eversion   Lumbar flex and SB stretch  5xea 10\" Red stability ball; Added 5/30               Supine      Quad sets   15x 5 sec Towel under knee   SAQs 20x     Heel slides   Orange slide tube   Bridge 15x     SLR  10x2     LTR 5xea 10\" Added 5/30         Sidelying      Hip abduction  15x     Clamshells 15x     Reverse clamshells  15x           Other:         Treatment Charges: Mins Units   []  Modalities       [x]  Ther Exercise 54 4   []  Neuromuscular Re-ed     []  Gait Training     []

## 2025-06-04 ENCOUNTER — HOSPITAL ENCOUNTER (OUTPATIENT)
Age: 55
Setting detail: THERAPIES SERIES
Discharge: HOME | End: 2025-06-04
Payer: MEDICAID

## 2025-06-04 PROCEDURE — 97110 THERAPEUTIC EXERCISES: CPT

## 2025-06-04 NOTE — FLOWSHEET NOTE
[x] [facility]  Outpatient Rehabilitation &  Therapy  [address] [place] [address]  [phone]  [phone]     Physical Therapy Daily Treatment Note    Date:  2025  Patient Name:  Bacilio Robles    :  1970  MRN: [id number]  Physician: Riley Ron DO                                Insurance: [facility] (12 visits  48 UNITS, from 25 to 25)  Medical Diagnosis: Leg length inequality (M21.70)                           Rehab Codes: M79.661, M25.661, M25.671, M62.81  Onset date: 3/12/25               Next Dr's appt.: ?  Visit# / total visits:     Cancels/No Shows: 0/0    Subjective:    Pain:  [x] Yes  [] No Location: R LE Pain Rating: (0-10 scale) 6/10  Pain altered Tx:  [x] No  [] Yes  Action:  Comments: Patient arrives noting pain is 6/10 today.    Objective:  Modalities:   Precautions [x] No  [] Yes:   Exercises:  Exercise Reps/ Time Weight/ Level Comments   Nustep  6 mins L3          Standing      Gastroc stretch   3x 30 sec wedge   Tband TKE    15x lime    Heel/toe raises   15x 1.5lb    Hip abduction  10x2ea 1.5lb    Hip extension   10x2ea 1.5lb    Hamstring curls  10x2ea 1.5lb    Hip flex    10x2ea 1.5lb    Marches 10x2ea 1.5lb    Step ups with  eccentric control  10x ea 6\"    Lateral step ups 10x 6\" Added 5/30   Leg press 25x 40 lbs Increased weight 6/4         Seated      Hamstring stretch  3x 30 sec stool   LAQs    10x2 2 lbs    Longsitting calf stretch 3x 30 sec towel   R Ankle theraband 4 way 20x blue A/P, inversion/eversion   Lumbar flex and SB stretch     5xea 10\" Red stability ball; Added 5/30               Supine      Quad sets   15x 5 sec Towel under knee   SAQs   20x     Heel slides   Orange slide tube   Bridge   15x     SLR   10x2     LTR  5xea 10\" Added 5/30         Sidelying      Hip abduction  15x     Clamshells 15x     Reverse clamshells  15x           Other:         Treatment Charges: Mins Units   []  Modalities       [x]  Ther Exercise 58 4   []

## 2025-06-06 ENCOUNTER — HOSPITAL ENCOUNTER (OUTPATIENT)
Age: 55
Setting detail: THERAPIES SERIES
Discharge: HOME | End: 2025-06-06
Payer: MEDICAID

## 2025-06-06 PROCEDURE — 97110 THERAPEUTIC EXERCISES: CPT

## 2025-06-06 NOTE — FLOWSHEET NOTE
[x] [facility]  Outpatient Rehabilitation &  Therapy  [address]  [phone]  [phone]     Physical Therapy Daily Treatment Note    Date:  2025  Patient Name:  Bacilio Robles    :  1970  MRN: [id number]  Physician: Riley Ron DO                                Insurance: [facility] (12 visits  48 UNITS, from 25 to 25)  Medical Diagnosis: Leg length inequality (M21.70)                           Rehab Codes: M79.661, M25.661, M25.671, M62.81  Onset date: 3/12/25               Next Dr's appt.: ?  Visit# / total visits:     Cancels/No Shows: 0/0    Subjective:    Pain:  [x] Yes  [] No Location: R LE Pain Rating: (0-10 scale) 6/10  Pain altered Tx:  [x] No  [] Yes  Action:  Comments: Patient arrives 11 minutes late then needed to use restroom. States pain is 6/10 with rainy weather today.     Objective:  Modalities:   Precautions [x] No  [] Yes:   Exercises:  Exercise Reps/ Time Weight/ Level Comments   Nustep  5 mins L3          Standing      Gastroc stretch    3x 30 sec wedge   Tband TKE      15x lime    Heel/toe raises   15x 1.5lb    Hip abduction   10x2ea 1.5lb    Hip extension    10x2ea 1.5lb    Hamstring curls   10x2ea 1.5lb    Hip flex     10x2ea 1.5lb    Marches    10x2ea 1.5lb    Step ups with  eccentric control  10x ea 6\"    Lateral step ups 10x 6\" Added 5/30   Leg press   25x 40 lbs          Seated      Hamstring stretch  3x 30 sec stool   LAQs    10x2 2 lbs    Longsitting calf stretch 3x 30 sec towel   R Ankle theraband 4 way 20x blue A/P, inversion/eversion   Lumbar flex and SB stretch   5xea 10\" Red stability ball; Added 5/30               Supine      Quad sets   15x 5 sec Towel under knee   SAQs   20x     Heel slides   Orange slide tube   Bridge   15x     SLR     10x2     LTR  5xea 10\" Added 5/30         Sidelying      Hip abduction  15x     Clamshells 15x     Reverse clamshells  15x           Other:         Treatment Charges: Mins Units   []

## 2025-06-13 ENCOUNTER — HOSPITAL ENCOUNTER (OUTPATIENT)
Age: 55
Setting detail: THERAPIES SERIES
Discharge: HOME | End: 2025-06-13
Payer: MEDICAID

## 2025-06-13 PROCEDURE — 97110 THERAPEUTIC EXERCISES: CPT

## 2025-06-13 NOTE — FLOWSHEET NOTE
(Mirrored)  - 2 x daily - 7 x weekly - 1 sets - 10 reps  - Supine Knee Extension Strengthening (Mirrored)  - 2 x daily - 7 x weekly - 1 sets - 10 reps  - Supine Bridge  - 2 x daily - 7 x weekly - 1 sets - 10 reps     Plan: [x] Continue current frequency toward long and short term goals.    [x] Specific Instructions for subsequent treatments: R calf stretches, R LE strengthening (focus on the knee and hip but also include ankle)       Time In: 9:40 am            Time Out: 10:50 am    Electronically signed by:  Linda Truong PT

## 2025-06-18 ENCOUNTER — HOSPITAL ENCOUNTER (OUTPATIENT)
Age: 55
Setting detail: THERAPIES SERIES
Discharge: HOME | End: 2025-06-18
Payer: MEDICAID

## 2025-06-18 PROCEDURE — 97110 THERAPEUTIC EXERCISES: CPT

## 2025-06-18 NOTE — FLOWSHEET NOTE
[x] [facility]  Outpatient Rehabilitation &  Therapy  [address]  [phone]  [phone]     Physical Therapy Daily Treatment Note    Date:  2025  Patient Name:  Bacilio Robles    :  1970  MRN: [id number]  Physician: Riley Ron DO                                Insurance: [facility] (12 visits  48 UNITS, from 25 to 25)  Medical Diagnosis: Leg length inequality (M21.70)                           Rehab Codes: M79.661, M25.661, M25.671, M62.81  Onset date: 3/12/25               Next Dr's appt.: ?  Visit# / total visits: 10/12    Cancels/No Shows: 0/1    Subjective:    Pain:  [x] Yes  [] No Location: R LE Pain Rating: (0-10 scale) 6/10  Pain altered Tx:  [x] No  [] Yes  Action:  Comments: Patient continues to note no change in pain level.    Objective:  Modalities:   Precautions [x] No  [] Yes:   Exercises:  Exercise Reps/ Time Weight/ Level Comments   Nustep  5 mins L3          Standing      Gastroc stretch   3x 30 sec wedge   Tband TKE      20x Blue    Heel/toe raises   15x2 2lb    Hip abduction    10x2ea 2lb    Hip extension    10x2ea 2lb    Hamstring curls 10x2ea 2lb    Hip flex      10x2ea 2lb    Marches   10x2ea 2lb    Step ups with  eccentric control   10x2 ea 6\" Increased reps   Lateral step ups  10x2 ea 6\"    Leg press    2x15 50 lbs    Matrix HS curls  LAQs 2x10 40 lbs added         Seated      Hamstring stretch  3x 30 sec stool   LAQs    10x2 2 lbs    Longsitting calf stretch  3x 30 sec towel   R Ankle theraband 4 way  20x blue A/P, inversion/eversion   Lumbar flex and SB stretch   5xea 10\" Red stability ball               Supine      Quad sets     Towel under knee   SAQs        Heel slides   Orange slide tube   Bridge        SLR          LTR    Added 5/30         Sidelying      Hip abduction       Clamshells      Reverse clamshells             Other: moderate difficulty with ascending/descending steps, moderate difficulty walking long distances,

## 2025-06-19 NOTE — PROGRESS NOTES
[x] [facility]  Outpatient Rehabilitation &  Therapy  [address]  [phone]  [phone] [] [facility]  Outpatient Rehabilitation &  Therapy  [address]  P: [phone]  F: [phone] [] [facility]  Outpatient Rehabilitation &  Therapy  [address]  P: [phone]  F: [phone] [] [facility]  Outpatient Rehabilitation &  Therapy  [address]  [phone]  [phone] [] [facility]  Outpatient Rehabilitation &  Therapy  [address]   P: [phone]  F: [phone]  [] [facility]  Outpatient Rehabilitation &  Therapy  [address]  P: [phone]  F: [phone] [] [facility]  Outpatient Rehabilitation &  Therapy  [address]  P: [phone]  F: [phone] [] [facility]  Outpatient Rehabilitation &  Therapy  [address]  P: [phone]  F: [phone] [] [facility]  Outpatient Rehabilitation &  Therapy  [address]  P: [phone]  F: [phone]  [] [facility] Outpatient Rehabilitation &  Therapy  [address]  P: [phone]  F: [phone]     Physical Therapy Progress Note    Date: 2025      Patient: Bacilio Robles  : 1970  MRN: [id number]    Physician: Riley Ron DO                                Insurance: [facility] (12 visits  48 UNITS, from 25 to 25)  Medical Diagnosis: Leg length inequality (M21.70)                           Rehab Codes: M79.661, M25.661, M25.671, M62.81  Onset date: 3/12/25               Next 's appt.: ?  Visit# / total visits: 10/12                    Cancels/No Shows: 0/    Date range of services: 25 to 25      Subjective:  Pain:  [x] Yes  [] No   Location: R LE            Pain Rating: (0-10 scale) 6/10  Pain

## 2025-06-20 ENCOUNTER — HOSPITAL ENCOUNTER (OUTPATIENT)
Age: 55
Setting detail: THERAPIES SERIES
Discharge: HOME | End: 2025-06-20
Payer: MEDICAID

## 2025-06-20 PROCEDURE — 97110 THERAPEUTIC EXERCISES: CPT

## 2025-06-20 NOTE — FLOWSHEET NOTE
[x] [facility]  Outpatient Rehabilitation &  Therapy  [address]  [phone]  [phone]     Physical Therapy Daily Treatment Note    Date:  2025  Patient Name:  Bacilio Robles    :  1970  MRN: [id number]  Physician: Riley Ron DO                                Insurance: [facility] (12 visits  48 UNITS, from 25 to 25)  Medical Diagnosis: Leg length inequality (M21.70)                           Rehab Codes: M79.661, M25.661, M25.671, M62.81  Onset date: 3/12/25               Next Dr's appt.: ?  Visit# / total visits:     Cancels/No Shows: 0/1    Subjective:    Pain:  [x] Yes  [] No Location: R LE Pain Rating: (0-10 scale) 6-7/10  Pain altered Tx:  [x] No  [] Yes  Action:  Comments: Patient notes pain is 6-7/10 today.    Objective:  Modalities:   Precautions [x] No  [] Yes:   Exercises:  Exercise Reps/ Time Weight/ Level Comments   Nustep  5 mins L3          Standing      Gastroc stretch    3x 30 sec wedge   Tband TKE      20x Blue    Heel/toe raises    15x2 2lb    Hip abduction   10x2ea 2lb    Hip extension     10x2ea 2lb    Hamstring curls  10x2ea 2lb    Hip flex      10x2ea 2lb    Marches   10x2ea 2lb    Step ups with  eccentric control   10x2 ea 6\"    Lateral step ups  10x2 ea 6\"    Leg press    2x15 50 lbs    Matrix HS curls  LAQs 2x10 40 lbs reviewed         Seated      Hamstring stretch  3x 30 sec stool   LAQs    10x2 2 lbs    Longsitting calf stretch  3x 30 sec towel   R Ankle theraband 4 way  20x blue A/P, inversion/eversion   Lumbar flex and SB stretch   5xea 10\" Red stability ball               Supine      Quad sets     Towel under knee   SAQs        Heel slides   Orange slide tube   Bridge        SLR          LTR    Added 5/30         Sidelying      Hip abduction       Clamshells      Reverse clamshells             Other:          Treatment Charges: Mins Units   []  Modalities       [x]  Ther Exercise 41 3   []  Neuromuscular Re-ed     []

## 2025-06-24 NOTE — PRE-CERTIFICATION NOTE
[x] [facility]  Outpatient Rehabilitation &  Therapy  [address]  [phone]  [phone] [] [facility]  Outpatient Rehabilitation &  Therapy  [address]  P: [phone]  F: [phone] [] [facility]  Outpatient Rehabilitation &  Therapy  [address]  P: [phone]  F: [phone] [] [facility]  Outpatient Rehabilitation &  Therapy  [address]  [phone]  [phone] [] [facility]  Outpatient Rehabilitation &  Therapy  [address]   P: [phone]  F: [phone]  [] [facility]  Outpatient Rehabilitation &  Therapy  [address]  P: [phone]  F: [phone] [] [facility]  Outpatient Rehabilitation &  Therapy  [address]  P: [phone]  F: [phone] [] [facility]  Outpatient Rehabilitation &  Therapy  [address]  P: [phone]  F: [phone] [] [facility]  Outpatient Rehabilitation &  Therapy  [address]  P: [phone]  F: [phone]  [] [facility] Outpatient Rehabilitation &  Therapy  [address]  P: [phone]  F: [phone]          Therapy Pre-certification Note      6/24/2025    Bacilio Robles  1970   [id number]      Insurance approval was received for Physical Therapy from [facility] on 6/24/2025.  Approval was received from 6/26/2025 to 8/8/2025.  Authorization number [id number].    [address], 32 units      [x] Patient is scheduled.  [] Primary therapist was notified.  [] Called patient to schedule and was able to schedule patient.  [] Called patient to schedule and message was left.  [] Called patient to schedule but was unable to reach patient.      Electronically signed by Fina Neville PT on 6/24/2025 at 2:53 PM

## 2025-06-25 ENCOUNTER — HOSPITAL ENCOUNTER (OUTPATIENT)
Age: 55
Setting detail: THERAPIES SERIES
Discharge: HOME | End: 2025-06-25
Payer: MEDICAID

## 2025-06-25 NOTE — FLOWSHEET NOTE
[x] [facility]  Outpatient Rehabilitation &  Therapy  [address]  [phone]  [phone]     Therapy Cancel/No Show note    Date: 2025  Patient: Bacilio Robles  : 1970  MRN: [id number]    Cancels/No Shows to date:     For today's appointment patient:    [x]  Cancelled    [] Rescheduled appointment    [] No-show     Reason given by patient:    []  Patient ill    []  Conflicting appointment    [x] No transportation      [] Conflict with work    [] No reason given    [] Weather related    [] COVID-19    [x] Other: patient notes transportation has not picked him up       Comments:       [x] Next appointment was confirmed and scheduled for  at 9 am    Electronically signed by: JANNIE MCKEON PTA

## (undated) DEVICE — FIAPC® PROBE W/ FILTER 2200 C OD 2.3MM/6.9FR; L 2.2M/7.2FT: Brand: ERBE

## (undated) DEVICE — ADAPTER TBNG LUER STUB 15 GA INTMED

## (undated) DEVICE — GAUZE,SPONGE,4"X4",16PLY,STRL,LF,10/TRAY: Brand: MEDLINE

## (undated) DEVICE — SYRINGE MED 50ML LUERLOCK TIP

## (undated) DEVICE — CO2 CANNULA,SUPERSOFT, ADLT,7'O2,7'CO2: Brand: MEDLINE

## (undated) DEVICE — MEDICINE CUP, GRADUATED, STER: Brand: MEDLINE

## (undated) DEVICE — JELLY,LUBE,STERILE,FLIP TOP,TUBE,2-OZ: Brand: MEDLINE

## (undated) DEVICE — SNARE ENDOSCP M L240CM LOOP W27MM SHTH DIA2.4MM OVL FLX

## (undated) DEVICE — GOWN POLY REINF SONT XLG: Brand: MEDLINE INDUSTRIES, INC.

## (undated) DEVICE — SNARE ENDOSCP AD L240CM LOOP W10MM SHTH DIA2.4MM RND INSUL

## (undated) DEVICE — FORCEPS BX L240CM WRK CHN 2.8MM STD CAP W/ NDL MIC MESH

## (undated) DEVICE — STAZ ENDO KIT: Brand: MEDLINE INDUSTRIES, INC.

## (undated) DEVICE — TRAP POLYP ETRAP